# Patient Record
Sex: FEMALE | Race: WHITE | Employment: FULL TIME | URBAN - METROPOLITAN AREA
[De-identification: names, ages, dates, MRNs, and addresses within clinical notes are randomized per-mention and may not be internally consistent; named-entity substitution may affect disease eponyms.]

---

## 2017-07-03 PROBLEM — N93.9 ABNORMAL UTERINE BLEEDING (AUB): Status: ACTIVE | Noted: 2017-07-03

## 2019-08-05 ENCOUNTER — APPOINTMENT (OUTPATIENT)
Dept: GENERAL RADIOLOGY | Age: 54
End: 2019-08-05
Payer: COMMERCIAL

## 2019-08-05 ENCOUNTER — HOSPITAL ENCOUNTER (EMERGENCY)
Age: 54
Discharge: HOME | End: 2019-08-06
Payer: COMMERCIAL

## 2019-08-05 DIAGNOSIS — T07.XXXA MULTIPLE ABRASIONS: ICD-10-CM
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
## 2019-08-05 DIAGNOSIS — S52.601A CLOSED FRACTURE OF DISTAL END OF RIGHT ULNA, UNSPECIFIED FRACTURE MORPHOLOGY, INITIAL ENCOUNTER: ICD-10-CM

## 2019-08-05 PROCEDURE — 96375 TX/PRO/DX INJ NEW DRUG ADDON: CPT

## 2019-08-05 PROCEDURE — 99284 EMERGENCY DEPT VISIT MOD MDM: CPT

## 2019-08-05 PROCEDURE — 96374 THER/PROPH/DIAG INJ IV PUSH: CPT

## 2019-08-05 PROCEDURE — 73502 X-RAY EXAM HIP UNI 2-3 VIEWS: CPT

## 2019-08-05 PROCEDURE — 6360000002 HC RX W HCPCS: Performed by: EMERGENCY MEDICINE

## 2019-08-05 PROCEDURE — 6360000002 HC RX W HCPCS: Performed by: NURSE PRACTITIONER

## 2019-08-05 PROCEDURE — 4500000024 HC ED LEVEL 4 PROCEDURE

## 2019-08-05 PROCEDURE — 96376 TX/PRO/DX INJ SAME DRUG ADON: CPT

## 2019-08-05 PROCEDURE — 73560 X-RAY EXAM OF KNEE 1 OR 2: CPT

## 2019-08-05 PROCEDURE — 73590 X-RAY EXAM OF LOWER LEG: CPT

## 2019-08-05 PROCEDURE — 73110 X-RAY EXAM OF WRIST: CPT

## 2019-08-05 PROCEDURE — 6360000002 HC RX W HCPCS

## 2019-08-05 RX ORDER — ONDANSETRON 2 MG/ML
4 INJECTION INTRAMUSCULAR; INTRAVENOUS ONCE
Status: COMPLETED | OUTPATIENT
Start: 2019-08-05 | End: 2019-08-05

## 2019-08-05 RX ORDER — MORPHINE SULFATE 4 MG/ML
4 INJECTION, SOLUTION INTRAMUSCULAR; INTRAVENOUS
Status: DISCONTINUED | OUTPATIENT
Start: 2019-08-05 | End: 2019-08-06 | Stop reason: HOSPADM

## 2019-08-05 RX ORDER — ONDANSETRON 2 MG/ML
4 INJECTION INTRAMUSCULAR; INTRAVENOUS ONCE
Status: COMPLETED | OUTPATIENT
Start: 2019-08-06 | End: 2019-08-05

## 2019-08-05 RX ORDER — ONDANSETRON 2 MG/ML
INJECTION INTRAMUSCULAR; INTRAVENOUS
Status: COMPLETED
Start: 2019-08-05 | End: 2019-08-05

## 2019-08-05 RX ADMIN — ONDANSETRON 4 MG: 2 INJECTION INTRAMUSCULAR; INTRAVENOUS at 23:22

## 2019-08-05 RX ADMIN — HYDROMORPHONE HYDROCHLORIDE 0.5 MG: 1 INJECTION, SOLUTION INTRAMUSCULAR; INTRAVENOUS; SUBCUTANEOUS at 23:56

## 2019-08-05 RX ADMIN — ONDANSETRON 4 MG: 2 INJECTION INTRAMUSCULAR; INTRAVENOUS at 23:56

## 2019-08-05 RX ADMIN — MORPHINE SULFATE 4 MG: 4 INJECTION, SOLUTION INTRAMUSCULAR; INTRAVENOUS at 23:22

## 2019-08-05 ASSESSMENT — PAIN DESCRIPTION - DESCRIPTORS: DESCRIPTORS: ACHING;THROBBING

## 2019-08-05 ASSESSMENT — PAIN SCALES - GENERAL
PAINLEVEL_OUTOF10: 7
PAINLEVEL_OUTOF10: 9
PAINLEVEL_OUTOF10: 9

## 2019-08-05 ASSESSMENT — PAIN DESCRIPTION - ORIENTATION: ORIENTATION: RIGHT

## 2019-08-05 ASSESSMENT — PAIN DESCRIPTION - FREQUENCY: FREQUENCY: CONTINUOUS

## 2019-08-05 ASSESSMENT — PAIN DESCRIPTION - PAIN TYPE: TYPE: ACUTE PAIN

## 2019-08-05 ASSESSMENT — PAIN DESCRIPTION - ONSET: ONSET: SUDDEN

## 2019-08-05 ASSESSMENT — PAIN DESCRIPTION - PROGRESSION: CLINICAL_PROGRESSION: GRADUALLY WORSENING

## 2019-08-05 ASSESSMENT — PAIN DESCRIPTION - LOCATION: LOCATION: KNEE;WRIST

## 2019-08-06 VITALS
OXYGEN SATURATION: 99 % | HEART RATE: 89 BPM | DIASTOLIC BLOOD PRESSURE: 67 MMHG | TEMPERATURE: 97.9 F | SYSTOLIC BLOOD PRESSURE: 115 MMHG | RESPIRATION RATE: 16 BRPM | HEIGHT: 68 IN | WEIGHT: 157 LBS | BODY MASS INDEX: 23.79 KG/M2

## 2019-08-06 PROCEDURE — 90471 IMMUNIZATION ADMIN: CPT | Performed by: NURSE PRACTITIONER

## 2019-08-06 PROCEDURE — 6370000000 HC RX 637 (ALT 250 FOR IP): Performed by: NURSE PRACTITIONER

## 2019-08-06 PROCEDURE — 6360000002 HC RX W HCPCS: Performed by: NURSE PRACTITIONER

## 2019-08-06 PROCEDURE — 90715 TDAP VACCINE 7 YRS/> IM: CPT | Performed by: NURSE PRACTITIONER

## 2019-08-06 RX ORDER — HYDROCODONE BITARTRATE AND ACETAMINOPHEN 5; 325 MG/1; MG/1
1 TABLET ORAL EVERY 6 HOURS PRN
Qty: 12 TABLET | Refills: 0 | Status: SHIPPED | OUTPATIENT
Start: 2019-08-06 | End: 2019-08-09

## 2019-08-06 RX ORDER — PENICILLIN V POTASSIUM 500 MG/1
500 TABLET ORAL 3 TIMES DAILY
Qty: 21 TABLET | Refills: 0 | Status: SHIPPED | OUTPATIENT
Start: 2019-08-06 | End: 2019-08-13

## 2019-08-06 RX ORDER — HYDROCODONE BITARTRATE AND ACETAMINOPHEN 5; 325 MG/1; MG/1
1 TABLET ORAL ONCE
Status: COMPLETED | OUTPATIENT
Start: 2019-08-06 | End: 2019-08-06

## 2019-08-06 RX ADMIN — HYDROCODONE BITARTRATE AND ACETAMINOPHEN 1 TABLET: 5; 325 TABLET ORAL at 01:19

## 2019-08-06 RX ADMIN — TETANUS TOXOID, REDUCED DIPHTHERIA TOXOID AND ACELLULAR PERTUSSIS VACCINE, ADSORBED 0.5 ML: 5; 2.5; 8; 8; 2.5 SUSPENSION INTRAMUSCULAR at 01:20

## 2019-08-06 ASSESSMENT — PAIN SCALES - GENERAL: PAINLEVEL_OUTOF10: 7

## 2019-08-06 NOTE — PROGRESS NOTES
The wound is cleansed, and debrided of foreign material as much as possible. Wounds were cleaned with dynahex and normal saline. The wound on PT right knee was cleaned with normal saline and dynahex. Irrigated with 100 ml of normal saline. PT verbalized comfort (as much as possible). ED RN aware of wounds being cleaned.
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
## 2020-02-20 ENCOUNTER — HOSPITAL ENCOUNTER (OUTPATIENT)
Dept: WOMENS IMAGING | Age: 55
Discharge: HOME | End: 2020-02-20
Payer: COMMERCIAL

## 2020-02-20 PROCEDURE — 77063 BREAST TOMOSYNTHESIS BI: CPT

## 2020-02-28 ENCOUNTER — HOSPITAL ENCOUNTER (OUTPATIENT)
Dept: WOMENS IMAGING | Age: 55
Discharge: HOME | End: 2020-02-28
Payer: COMMERCIAL

## 2020-02-28 PROCEDURE — G0279 TOMOSYNTHESIS, MAMMO: HCPCS

## 2020-02-28 PROCEDURE — 76642 ULTRASOUND BREAST LIMITED: CPT

## 2020-02-28 NOTE — PROGRESS NOTES
Patient seen for imaging at [facility]. Radiologist recommends breast biopsy. Education regarding biopsy procedure provided. Patient made aware that as a [facility] patient, she will receive a call from their office regarding referral for biopsy. Navigator contact information provided, if any further needs or questions. Reports faxed to [facility].     FRANCIS Robertson, SHARI  Patient [address] [facility]  [phone]

## 2020-03-17 ENCOUNTER — HOSPITAL ENCOUNTER (OUTPATIENT)
Dept: MRI IMAGING | Age: 55
Discharge: HOME | End: 2020-03-17
Payer: COMMERCIAL

## 2020-03-17 PROCEDURE — A9579 GAD-BASE MR CONTRAST NOS,1ML: HCPCS | Performed by: SURGERY

## 2020-03-17 PROCEDURE — 6360000004 HC RX CONTRAST MEDICATION: Performed by: SURGERY

## 2020-03-17 PROCEDURE — 77049 MRI BREAST C-+ W/CAD BI: CPT

## 2020-03-17 RX ADMIN — GADOTERIDOL 14 ML: 279.3 INJECTION, SOLUTION INTRAVENOUS at 11:27

## 2020-03-20 ENCOUNTER — TELEPHONE (OUTPATIENT)
Dept: WOMENS IMAGING | Age: 55
End: 2020-03-20

## 2020-03-20 ENCOUNTER — OFFICE VISIT (OUTPATIENT)
Dept: SURGERY | Age: 55
End: 2020-03-20
Payer: COMMERCIAL

## 2020-03-20 VITALS
HEART RATE: 69 BPM | OXYGEN SATURATION: 98 % | BODY MASS INDEX: 27.43 KG/M2 | TEMPERATURE: 97.2 F | WEIGHT: 181 LBS | DIASTOLIC BLOOD PRESSURE: 68 MMHG | HEIGHT: 68 IN | SYSTOLIC BLOOD PRESSURE: 115 MMHG

## 2020-03-20 PROCEDURE — 99205 OFFICE O/P NEW HI 60 MIN: CPT | Performed by: SURGERY

## 2020-03-20 RX ORDER — TRAZODONE HYDROCHLORIDE 50 MG/1
50 TABLET ORAL NIGHTLY
COMMUNITY

## 2020-03-20 NOTE — PROGRESS NOTES
Negative for new environmental or food allergies. Neurological: Negative for dizziness, seizures, speech difficulty, numbness. Hematological: Negative for adenopathy. Psychiatric/Behavioral: Negative for agitation and confusion. EXAM  /68 (Site: Left Upper Arm, Position: Sitting, Cuff Size: Small Adult)   Pulse 69   Temp 97.2 °F (36.2 °C) (Oral)   Ht 5' 8\" (1.727 m)   Wt 181 lb (82.1 kg)   SpO2 98%   BMI 27.52 kg/m²     GEN: NAD, pleasant, appears stated age  CVS: RRR  PULM: No respiratory distress  HEENT: PERRLA/EOMI; dentition intact, hearing appears within normal limits  NECK: Supple with trachea in midline, no masses  EXT: No lymphedema noted  ABD: soft/NT/ND  No palpable hernia  Excess skin noted  NEURO: No focal deficits, no obvious CN deficits  BACK: Bilateral latiss muscle intact  BREAST:   Physical Exam    Bra size: 36C  Desired bra size: Same size  Ptosis grade:   R: 3   L: 3  The left breast size is larger than the right breast.  There was one palpable mass (hematoma) with no palpable lymphadenopathy in the ipsilateral left axilla. Nipple retraction: No    Left breast sternal notch to nipple: 26 cm  Right breast sternal notch to nipple: 27 cm    Left breast nipple to inframammary fold: 6.2 cm  Right breast nipple to inframammary fold: 6.3 cm    Left breast width 13.2 cm  Right breast width 13.4 cm    IMAGING: Reviewed    IMP: 48 y.o. female with breast cancer who presents for discussion regarding reconstruction options  PLAN: Would benefit from immediate TE reconstruction with Wise pattern Autoderm technique for improved shape/contour. Will await genetic testing to determine if she would like bilateral. May benefit from delayed immediate reconstruction with ISREAL - unsure at this time.     A discussion regarding surgical options including immediate vs delayed approaches, implant based vs autologous, as well as additional planned revisional surgeries was performed with the patient

## 2020-03-20 NOTE — TELEPHONE ENCOUNTER
How long does the test take? About 60 minutes. Most of the time is spent preparing for the images and finding the area for the biopsy. 1.  What are the risks?  Bleeding: You may have some bleeding which can cause bruising, swelling, or hematoma.  Infection: Signs of infection are redness, swelling, heat, or increasing pain at site.  Sample is not adequate: This would require repeating the biopsy. What happens after the test?  The nurse will review your post biopsy instructions which include:        Placing an ice pack in your bra.   Wearing a firm fitting bra.   You may use Tylenol (Acetaminiphen) for discomfort. Lab results take about 2-3 business days. The Nurse Navigator or your doctor will call you with the results. Pre Stereotactic Breast Biopsy:     (Please arrive 15 minutes early)                                                 [x] Blood thinner history reviewed with patient    [x] Take all your other medications on your normal routine schedule. [x] You may eat and drink as normal before your biopsy. [x] You may drive yourself. [x] Take a shower the night before or the morning of the biopsy. [x] No heavy lifting or exercise for 48 hours after the biopsy. [x] Printed Pre Stereotactic Biopsy Instructions were provided, reviewed with the patient and all questions were answered. Duglas Coelho RN  3/20/2020  3:17 PM                [facility] Information: Augustina Canon - Friday. Should you experience any significant changes in your health or have questions about your care, please contact:  Duglas Coelho Nurse Navigator with [facility], [address] [facility]  Monday-Friday. If you need help with your care outside these hours and cannot wait until we are again available, contact your Physician or go to the hospital emergency.

## 2020-03-26 ENCOUNTER — TELEPHONE (OUTPATIENT)
Dept: SURGERY | Age: 55
End: 2020-03-26

## 2020-03-26 ENCOUNTER — HOSPITAL ENCOUNTER (OUTPATIENT)
Age: 55
Setting detail: SPECIMEN
Discharge: HOME | End: 2020-03-26
Payer: COMMERCIAL

## 2020-03-27 ENCOUNTER — HOSPITAL ENCOUNTER (OUTPATIENT)
Dept: GENERAL RADIOLOGY | Age: 55
Discharge: HOME | End: 2020-03-27
Payer: COMMERCIAL

## 2020-03-27 ENCOUNTER — TELEPHONE (OUTPATIENT)
Dept: SURGERY | Age: 55
End: 2020-03-27

## 2020-03-27 ENCOUNTER — HOSPITAL ENCOUNTER (OUTPATIENT)
Age: 55
Discharge: HOME | End: 2020-03-27
Payer: COMMERCIAL

## 2020-03-27 PROCEDURE — 71046 X-RAY EXAM CHEST 2 VIEWS: CPT

## 2020-03-27 NOTE — TELEPHONE ENCOUNTER
Tiff we need to do the insurance approval and let Stephanie Hughes know when this is done [phone]

## 2020-03-30 ENCOUNTER — ANESTHESIA EVENT (OUTPATIENT)
Dept: OPERATING ROOM | Age: 55
End: 2020-03-30
Payer: COMMERCIAL

## 2020-03-30 NOTE — PROGRESS NOTES
[facility] / [facility] ([facility]) [address] [facility], [address]    Acknowledgment of Informed Consent for Surgical or Medical Procedure and Sedation  I agree to allow doctor(s)  Josh Argueta AND Ann Mcbride and his/her associates or assistants, including residents and/or other qualified medical practitioner to perform the following medical treatment or procedure and to administer or direct the administration of sedation as necessary:  Procedure(s): LEFT NIPPLE SPARING MASTECTOMY, SENTINEL NODE BIOPSY/ LEFT BREAST RECONSTRUCTION WITH STAGE 1 TISSUE EXPANDER PLACEMENT WITH AUTODERM, POSSIBLE ALLODERM  My doctor has explained the following regarding the proposed procedure:   the explanation of the procedure   the benefits of the procedure   the potential problems that might occur during recuperation   the risks and side effects of the procedure which could include but are not limited to severe blood loss, infection, stroke or death   the benefits, risks and side effect of alternative procedures including the consequences of declining this procedure or any alternative procedures   the likelihood of achieving satisfactory results. I acknowledge no guarantee or assurance has been made to me regarding the results. I understand that during the course of this treatment/procedure, unforeseen conditions can occur which require an additional or different procedure. I agree to allow my physician or assistants to perform such extension of the original procedure as they may find necessary. I understand that sedation will often result in temporary impairment of memory and fine motor skills and that sedation can occasionally progress to a state of deep sedation or general anesthesia.     I understand the risks of anesthesia for surgery include, but are not limited to, sore throat, hoarseness, injury to face, mouth, or teeth; nausea; headache; injury to blood vessels or nerves; death, brain damage, or paralysis. I understand that if I have a Limitation of Treatment order in effect during my hospitalization, the order may or may not be in effect during this procedure. I give my doctor permission to give me blood or blood products. I understand that there are risks with receiving blood such as hepatitis, AIDS, fever, or allergic reaction. I acknowledge that the risks, benefits, and alternatives of this treatment have been explained to me and that no express or implied warranty has been given by the hospital, any blood bank, or any person or entity as to the blood or blood components transfused. At the discretion of my doctor, I agree to allow observers, equipment/product representatives and allow photographing, and/or televising of the procedure, provided my name or identity is maintained confidentially. I agree the hospital may dispose of or use for scientific or educational purposes any tissue, fluid, or body parts which may be removed.     ________________________________Date________Time______ am/pm  ([place])  Patient or Signature of Closest Relative or Legal Guardian    ________________________________Date________Time______am/pm      Page 1 of  1  Witness

## 2020-03-30 NOTE — TELEPHONE ENCOUNTER
[facility] [phone] to determine if Precert is required for CPT codes: 05252 and [phone]. For Diagnosis code C50.912. PreCert was required. Requested an Urgent precert. Approval Auth # [id number] 3/31/20-3/30/21. Called Nasreen Dubon at number listed below to make her aware.  DONE

## 2020-03-31 ENCOUNTER — ANESTHESIA (OUTPATIENT)
Dept: OPERATING ROOM | Age: 55
End: 2020-03-31
Payer: COMMERCIAL

## 2020-03-31 ENCOUNTER — HOSPITAL ENCOUNTER (OUTPATIENT)
Age: 55
Setting detail: OUTPATIENT SURGERY
Discharge: HOME | End: 2020-03-31
Attending: SURGERY | Admitting: SURGERY
Payer: COMMERCIAL

## 2020-03-31 ENCOUNTER — HOSPITAL ENCOUNTER (OUTPATIENT)
Dept: NUCLEAR MEDICINE | Age: 55
Discharge: HOME | End: 2020-03-31
Payer: COMMERCIAL

## 2020-03-31 VITALS
WEIGHT: 181 LBS | HEIGHT: 68 IN | RESPIRATION RATE: 13 BRPM | DIASTOLIC BLOOD PRESSURE: 67 MMHG | OXYGEN SATURATION: 100 % | SYSTOLIC BLOOD PRESSURE: 120 MMHG | TEMPERATURE: 97.4 F | HEART RATE: 65 BPM | BODY MASS INDEX: 27.43 KG/M2

## 2020-03-31 VITALS
OXYGEN SATURATION: 99 % | DIASTOLIC BLOOD PRESSURE: 56 MMHG | TEMPERATURE: 98.4 F | SYSTOLIC BLOOD PRESSURE: 99 MMHG | RESPIRATION RATE: 10 BRPM

## 2020-03-31 LAB
APTT: 29.7 SEC (ref 24.2–36.2)
INR BLD: 0.96 (ref 0.86–1.14)
PROTHROMBIN TIME: 11.1 SEC (ref 10–13.2)

## 2020-03-31 PROCEDURE — 2720000010 HC SURG SUPPLY STERILE: Performed by: SURGERY

## 2020-03-31 PROCEDURE — 2580000003 HC RX 258: Performed by: ANESTHESIOLOGY

## 2020-03-31 PROCEDURE — 38792 RA TRACER ID OF SENTINL NODE: CPT

## 2020-03-31 PROCEDURE — 6360000002 HC RX W HCPCS: Performed by: ANESTHESIOLOGY

## 2020-03-31 PROCEDURE — 85730 THROMBOPLASTIN TIME PARTIAL: CPT

## 2020-03-31 PROCEDURE — 3700000001 HC ADD 15 MINUTES (ANESTHESIA): Performed by: SURGERY

## 2020-03-31 PROCEDURE — 88307 TISSUE EXAM BY PATHOLOGIST: CPT

## 2020-03-31 PROCEDURE — 88342 IMHCHEM/IMCYTCHM 1ST ANTB: CPT

## 2020-03-31 PROCEDURE — 19357 TISS XPNDR PLMT BRST RCNSTJ: CPT | Performed by: SURGERY

## 2020-03-31 PROCEDURE — C1789 PROSTHESIS, BREAST, IMP: HCPCS | Performed by: SURGERY

## 2020-03-31 PROCEDURE — 85610 PROTHROMBIN TIME: CPT

## 2020-03-31 PROCEDURE — 6370000000 HC RX 637 (ALT 250 FOR IP): Performed by: SURGERY

## 2020-03-31 PROCEDURE — 2709999900 HC NON-CHARGEABLE SUPPLY: Performed by: SURGERY

## 2020-03-31 PROCEDURE — 2580000003 HC RX 258: Performed by: SURGERY

## 2020-03-31 PROCEDURE — 2500000003 HC RX 250 WO HCPCS: Performed by: SURGERY

## 2020-03-31 PROCEDURE — 7100000001 HC PACU RECOVERY - ADDTL 15 MIN: Performed by: SURGERY

## 2020-03-31 PROCEDURE — 3600000014 HC SURGERY LEVEL 4 ADDTL 15MIN: Performed by: SURGERY

## 2020-03-31 PROCEDURE — 6360000002 HC RX W HCPCS: Performed by: NURSE ANESTHETIST, CERTIFIED REGISTERED

## 2020-03-31 PROCEDURE — A9541 TC99M SULFUR COLLOID: HCPCS | Performed by: SURGERY

## 2020-03-31 PROCEDURE — 6360000002 HC RX W HCPCS: Performed by: SURGERY

## 2020-03-31 PROCEDURE — 2500000003 HC RX 250 WO HCPCS: Performed by: NURSE ANESTHETIST, CERTIFIED REGISTERED

## 2020-03-31 PROCEDURE — 88331 PATH CONSLTJ SURG 1 BLK 1SPC: CPT

## 2020-03-31 PROCEDURE — 3430000000 HC RX DIAGNOSTIC RADIOPHARMACEUTICAL: Performed by: SURGERY

## 2020-03-31 PROCEDURE — 3700000000 HC ANESTHESIA ATTENDED CARE: Performed by: SURGERY

## 2020-03-31 PROCEDURE — 7100000000 HC PACU RECOVERY - FIRST 15 MIN: Performed by: SURGERY

## 2020-03-31 PROCEDURE — C9290 INJ, BUPIVACAINE LIPOSOME: HCPCS | Performed by: SURGERY

## 2020-03-31 PROCEDURE — C1729 CATH, DRAINAGE: HCPCS | Performed by: SURGERY

## 2020-03-31 PROCEDURE — 3600000004 HC SURGERY LEVEL 4 BASE: Performed by: SURGERY

## 2020-03-31 DEVICE — TEXTURED, HIGH PROFILE, SUTURE TABS, INTEGRAL INJECTION DOME, 475CC
Type: IMPLANTABLE DEVICE | Site: NIPPLE | Status: FUNCTIONAL
Brand: ARTOURA BREAST TISSUE EXPANDER

## 2020-03-31 RX ORDER — CLINDAMYCIN HYDROCHLORIDE 300 MG/1
300 CAPSULE ORAL 3 TIMES DAILY
Qty: 30 CAPSULE | Refills: 0 | Status: SHIPPED | OUTPATIENT
Start: 2020-03-31 | End: 2020-04-10

## 2020-03-31 RX ORDER — FENTANYL CITRATE 50 UG/ML
25 INJECTION, SOLUTION INTRAMUSCULAR; INTRAVENOUS EVERY 5 MIN PRN
Status: DISCONTINUED | OUTPATIENT
Start: 2020-03-31 | End: 2020-03-31 | Stop reason: HOSPADM

## 2020-03-31 RX ORDER — ROCURONIUM BROMIDE 10 MG/ML
INJECTION, SOLUTION INTRAVENOUS PRN
Status: DISCONTINUED | OUTPATIENT
Start: 2020-03-31 | End: 2020-03-31 | Stop reason: SDUPTHER

## 2020-03-31 RX ORDER — 0.9 % SODIUM CHLORIDE 0.9 %
500 INTRAVENOUS SOLUTION INTRAVENOUS
Status: DISCONTINUED | OUTPATIENT
Start: 2020-03-31 | End: 2020-03-31 | Stop reason: HOSPADM

## 2020-03-31 RX ORDER — LIDOCAINE HYDROCHLORIDE 20 MG/ML
INJECTION, SOLUTION INFILTRATION; PERINEURAL PRN
Status: DISCONTINUED | OUTPATIENT
Start: 2020-03-31 | End: 2020-03-31 | Stop reason: SDUPTHER

## 2020-03-31 RX ORDER — GLYCOPYRROLATE 0.2 MG/ML
INJECTION INTRAMUSCULAR; INTRAVENOUS PRN
Status: DISCONTINUED | OUTPATIENT
Start: 2020-03-31 | End: 2020-03-31 | Stop reason: SDUPTHER

## 2020-03-31 RX ORDER — FENTANYL CITRATE 50 UG/ML
INJECTION, SOLUTION INTRAMUSCULAR; INTRAVENOUS PRN
Status: DISCONTINUED | OUTPATIENT
Start: 2020-03-31 | End: 2020-03-31 | Stop reason: SDUPTHER

## 2020-03-31 RX ORDER — HYDROMORPHONE HCL 110MG/55ML
PATIENT CONTROLLED ANALGESIA SYRINGE INTRAVENOUS PRN
Status: DISCONTINUED | OUTPATIENT
Start: 2020-03-31 | End: 2020-03-31 | Stop reason: SDUPTHER

## 2020-03-31 RX ORDER — DIAZEPAM 5 MG/1
5 TABLET ORAL EVERY 6 HOURS PRN
Status: COMPLETED | OUTPATIENT
Start: 2020-03-31 | End: 2020-03-31

## 2020-03-31 RX ORDER — DIAZEPAM 5 MG/1
5 TABLET ORAL EVERY 6 HOURS PRN
Qty: 30 TABLET | Refills: 0 | Status: SHIPPED | OUTPATIENT
Start: 2020-03-31 | End: 2020-04-10

## 2020-03-31 RX ORDER — ONDANSETRON 2 MG/ML
4 INJECTION INTRAMUSCULAR; INTRAVENOUS
Status: COMPLETED | OUTPATIENT
Start: 2020-03-31 | End: 2020-03-31

## 2020-03-31 RX ORDER — ONDANSETRON 2 MG/ML
INJECTION INTRAMUSCULAR; INTRAVENOUS PRN
Status: DISCONTINUED | OUTPATIENT
Start: 2020-03-31 | End: 2020-03-31 | Stop reason: SDUPTHER

## 2020-03-31 RX ORDER — PROPOFOL 10 MG/ML
INJECTION, EMULSION INTRAVENOUS PRN
Status: DISCONTINUED | OUTPATIENT
Start: 2020-03-31 | End: 2020-03-31 | Stop reason: SDUPTHER

## 2020-03-31 RX ORDER — SODIUM CHLORIDE, SODIUM LACTATE, POTASSIUM CHLORIDE, CALCIUM CHLORIDE 600; 310; 30; 20 MG/100ML; MG/100ML; MG/100ML; MG/100ML
INJECTION, SOLUTION INTRAVENOUS CONTINUOUS
Status: DISCONTINUED | OUTPATIENT
Start: 2020-03-31 | End: 2020-03-31 | Stop reason: HOSPADM

## 2020-03-31 RX ORDER — OXYCODONE HYDROCHLORIDE AND ACETAMINOPHEN 5; 325 MG/1; MG/1
1 TABLET ORAL EVERY 6 HOURS PRN
Qty: 28 TABLET | Refills: 0 | Status: SHIPPED | OUTPATIENT
Start: 2020-03-31 | End: 2020-04-07

## 2020-03-31 RX ORDER — CLINDAMYCIN PHOSPHATE 900 MG/50ML
900 INJECTION INTRAVENOUS
Status: COMPLETED | OUTPATIENT
Start: 2020-03-31 | End: 2020-03-31

## 2020-03-31 RX ORDER — ISOSULFAN BLUE 50 MG/5ML
INJECTION, SOLUTION SUBCUTANEOUS PRN
Status: DISCONTINUED | OUTPATIENT
Start: 2020-03-31 | End: 2020-03-31 | Stop reason: ALTCHOICE

## 2020-03-31 RX ORDER — OXYCODONE HYDROCHLORIDE AND ACETAMINOPHEN 5; 325 MG/1; MG/1
1 TABLET ORAL EVERY 6 HOURS PRN
Status: COMPLETED | OUTPATIENT
Start: 2020-03-31 | End: 2020-03-31

## 2020-03-31 RX ORDER — DEXAMETHASONE SODIUM PHOSPHATE 4 MG/ML
INJECTION, SOLUTION INTRA-ARTICULAR; INTRALESIONAL; INTRAMUSCULAR; INTRAVENOUS; SOFT TISSUE PRN
Status: DISCONTINUED | OUTPATIENT
Start: 2020-03-31 | End: 2020-03-31

## 2020-03-31 RX ORDER — DEXAMETHASONE SODIUM PHOSPHATE 4 MG/ML
4 INJECTION, SOLUTION INTRA-ARTICULAR; INTRALESIONAL; INTRAMUSCULAR; INTRAVENOUS; SOFT TISSUE
Status: DISCONTINUED | OUTPATIENT
Start: 2020-03-31 | End: 2020-03-31 | Stop reason: HOSPADM

## 2020-03-31 RX ADMIN — SODIUM CHLORIDE, SODIUM LACTATE, POTASSIUM CHLORIDE, AND CALCIUM CHLORIDE: 600; 310; 30; 20 INJECTION, SOLUTION INTRAVENOUS at 09:57

## 2020-03-31 RX ADMIN — GLYCOPYRROLATE 0.7 MG: 0.2 INJECTION INTRAMUSCULAR; INTRAVENOUS at 16:27

## 2020-03-31 RX ADMIN — ROCURONIUM BROMIDE 80 MG: 10 INJECTION, SOLUTION INTRAVENOUS at 13:24

## 2020-03-31 RX ADMIN — ONDANSETRON 4 MG: 2 INJECTION INTRAMUSCULAR; INTRAVENOUS at 13:20

## 2020-03-31 RX ADMIN — ROCURONIUM BROMIDE 30 MG: 10 INJECTION, SOLUTION INTRAVENOUS at 15:26

## 2020-03-31 RX ADMIN — PROPOFOL 100 MG: 10 INJECTION, EMULSION INTRAVENOUS at 16:32

## 2020-03-31 RX ADMIN — SODIUM CHLORIDE, SODIUM LACTATE, POTASSIUM CHLORIDE, AND CALCIUM CHLORIDE: 600; 310; 30; 20 INJECTION, SOLUTION INTRAVENOUS at 14:40

## 2020-03-31 RX ADMIN — PHENYLEPHRINE HYDROCHLORIDE 80 MCG: 10 INJECTION, SOLUTION INTRAMUSCULAR; INTRAVENOUS; SUBCUTANEOUS at 13:33

## 2020-03-31 RX ADMIN — HYDROMORPHONE HYDROCHLORIDE 2 MG: 2 INJECTION, SOLUTION INTRAMUSCULAR; INTRAVENOUS; SUBCUTANEOUS at 13:24

## 2020-03-31 RX ADMIN — PROPOFOL 200 MG: 10 INJECTION, EMULSION INTRAVENOUS at 13:24

## 2020-03-31 RX ADMIN — GLYCOPYRROLATE 0.3 MG: 0.2 INJECTION INTRAMUSCULAR; INTRAVENOUS at 15:23

## 2020-03-31 RX ADMIN — DIAZEPAM 5 MG: 5 TABLET ORAL at 18:38

## 2020-03-31 RX ADMIN — LIDOCAINE HYDROCHLORIDE 100 MG: 20 INJECTION, SOLUTION INFILTRATION; PERINEURAL at 13:24

## 2020-03-31 RX ADMIN — ONDANSETRON 4 MG: 2 INJECTION INTRAMUSCULAR; INTRAVENOUS at 18:02

## 2020-03-31 RX ADMIN — GLYCOPYRROLATE 0.3 MG: 0.2 INJECTION INTRAMUSCULAR; INTRAVENOUS at 13:33

## 2020-03-31 RX ADMIN — OXYCODONE HYDROCHLORIDE AND ACETAMINOPHEN 1 TABLET: 5; 325 TABLET ORAL at 18:38

## 2020-03-31 RX ADMIN — Medication 0.8 MILLICURIE: at 13:29

## 2020-03-31 RX ADMIN — NEOSTIGMINE METHYLSULFATE 4 MG: 1 INJECTION INTRAMUSCULAR; INTRAVENOUS; SUBCUTANEOUS at 16:27

## 2020-03-31 RX ADMIN — CLINDAMYCIN PHOSPHATE 900 MG: 18 INJECTION, SOLUTION INTRAVENOUS at 13:36

## 2020-03-31 RX ADMIN — TRANEXAMIC ACID 1000 MG: 1 INJECTION, SOLUTION INTRAVENOUS at 14:14

## 2020-03-31 RX ADMIN — FENTANYL CITRATE 100 MCG: 50 INJECTION INTRAMUSCULAR; INTRAVENOUS at 16:32

## 2020-03-31 RX ADMIN — FAMOTIDINE 20 MG: 10 INJECTION, SOLUTION INTRAVENOUS at 13:19

## 2020-03-31 ASSESSMENT — PULMONARY FUNCTION TESTS
PIF_VALUE: 18
PIF_VALUE: 14
PIF_VALUE: 14
PIF_VALUE: 17
PIF_VALUE: 16
PIF_VALUE: 16
PIF_VALUE: 18
PIF_VALUE: 17
PIF_VALUE: 16
PIF_VALUE: 17
PIF_VALUE: 16
PIF_VALUE: 17
PIF_VALUE: 14
PIF_VALUE: 18
PIF_VALUE: 17
PIF_VALUE: 16
PIF_VALUE: 17
PIF_VALUE: 17
PIF_VALUE: 16
PIF_VALUE: 14
PIF_VALUE: 18
PIF_VALUE: 1
PIF_VALUE: 17
PIF_VALUE: 1
PIF_VALUE: 17
PIF_VALUE: 16
PIF_VALUE: 17
PIF_VALUE: 16
PIF_VALUE: 17
PIF_VALUE: 17
PIF_VALUE: 16
PIF_VALUE: 18
PIF_VALUE: 14
PIF_VALUE: 17
PIF_VALUE: 18
PIF_VALUE: 17
PIF_VALUE: 18
PIF_VALUE: 16
PIF_VALUE: 17
PIF_VALUE: 16
PIF_VALUE: 17
PIF_VALUE: 16
PIF_VALUE: 14
PIF_VALUE: 14
PIF_VALUE: 16
PIF_VALUE: 17
PIF_VALUE: 16
PIF_VALUE: 17
PIF_VALUE: 1
PIF_VALUE: 1
PIF_VALUE: 17
PIF_VALUE: 17
PIF_VALUE: 18
PIF_VALUE: 17
PIF_VALUE: 8
PIF_VALUE: 17
PIF_VALUE: 16
PIF_VALUE: 16
PIF_VALUE: 17
PIF_VALUE: 4
PIF_VALUE: 17
PIF_VALUE: 16
PIF_VALUE: 17
PIF_VALUE: 18
PIF_VALUE: 18
PIF_VALUE: 1
PIF_VALUE: 17
PIF_VALUE: 16
PIF_VALUE: 17
PIF_VALUE: 16
PIF_VALUE: 17
PIF_VALUE: 16
PIF_VALUE: 17
PIF_VALUE: 18
PIF_VALUE: 17
PIF_VALUE: 16
PIF_VALUE: 17
PIF_VALUE: 17
PIF_VALUE: 16
PIF_VALUE: 18
PIF_VALUE: 20
PIF_VALUE: 16
PIF_VALUE: 18
PIF_VALUE: 16
PIF_VALUE: 17
PIF_VALUE: 18
PIF_VALUE: 14
PIF_VALUE: 17
PIF_VALUE: 19
PIF_VALUE: 17
PIF_VALUE: 1
PIF_VALUE: 17
PIF_VALUE: 2
PIF_VALUE: 16
PIF_VALUE: 16
PIF_VALUE: 19
PIF_VALUE: 17
PIF_VALUE: 15
PIF_VALUE: 18
PIF_VALUE: 16
PIF_VALUE: 16
PIF_VALUE: 17
PIF_VALUE: 16
PIF_VALUE: 26
PIF_VALUE: 17
PIF_VALUE: 18
PIF_VALUE: 17
PIF_VALUE: 18
PIF_VALUE: 16
PIF_VALUE: 17
PIF_VALUE: 16
PIF_VALUE: 17
PIF_VALUE: 14
PIF_VALUE: 18
PIF_VALUE: 17
PIF_VALUE: 17
PIF_VALUE: 18
PIF_VALUE: 17
PIF_VALUE: 17
PIF_VALUE: 15
PIF_VALUE: 17
PIF_VALUE: 17
PIF_VALUE: 16
PIF_VALUE: 18
PIF_VALUE: 17
PIF_VALUE: 16
PIF_VALUE: 18
PIF_VALUE: 16
PIF_VALUE: 17
PIF_VALUE: 8
PIF_VALUE: 17
PIF_VALUE: 17
PIF_VALUE: 16
PIF_VALUE: 13
PIF_VALUE: 18
PIF_VALUE: 16
PIF_VALUE: 8
PIF_VALUE: 17
PIF_VALUE: 18
PIF_VALUE: 17
PIF_VALUE: 17
PIF_VALUE: 8
PIF_VALUE: 14
PIF_VALUE: 16
PIF_VALUE: 17
PIF_VALUE: 17
PIF_VALUE: 16
PIF_VALUE: 16
PIF_VALUE: 19
PIF_VALUE: 16
PIF_VALUE: 18
PIF_VALUE: 17
PIF_VALUE: 16
PIF_VALUE: 17
PIF_VALUE: 17
PIF_VALUE: 14
PIF_VALUE: 18
PIF_VALUE: 17
PIF_VALUE: 26
PIF_VALUE: 17
PIF_VALUE: 17
PIF_VALUE: 0
PIF_VALUE: 17
PIF_VALUE: 19
PIF_VALUE: 18
PIF_VALUE: 17
PIF_VALUE: 14
PIF_VALUE: 16
PIF_VALUE: 16
PIF_VALUE: 17
PIF_VALUE: 17
PIF_VALUE: 18
PIF_VALUE: 0
PIF_VALUE: 16
PIF_VALUE: 18
PIF_VALUE: 17

## 2020-03-31 ASSESSMENT — PAIN SCALES - GENERAL
PAINLEVEL_OUTOF10: 0
PAINLEVEL_OUTOF10: 4

## 2020-03-31 ASSESSMENT — PAIN DESCRIPTION - LOCATION
LOCATION: BREAST
LOCATION: BREAST

## 2020-03-31 ASSESSMENT — PAIN - FUNCTIONAL ASSESSMENT: PAIN_FUNCTIONAL_ASSESSMENT: 0-10

## 2020-03-31 ASSESSMENT — PAIN DESCRIPTION - ORIENTATION
ORIENTATION: LEFT
ORIENTATION: LEFT

## 2020-03-31 ASSESSMENT — PAIN DESCRIPTION - PAIN TYPE
TYPE: SURGICAL PAIN
TYPE: SURGICAL PAIN

## 2020-03-31 NOTE — ANESTHESIA POSTPROCEDURE EVALUATION
Department of Anesthesiology  Postprocedure Note    Patient: Symone Sharif  MRN: [id number]  YOB: 1965  Date of evaluation: 3/31/2020  Time:  6:07 PM     Procedure Summary     Date:  03/31/20 Room / Location:  [facility]    Anesthesia Start:  2665 Anesthesia Stop:  3887    Procedures:       LEFT NIPPLE SPARING MASTECTOMY, SENTINEL NODE BIOPSY/ (Left Breast)      LEFT BREAST RECONSTRUCTION WITH STAGE 1 TISSUE EXPANDER PLACEMENT WITH AUTODERM (Left Breast) Diagnosis:       Malignant neoplasm of overlapping sites of left female breast, unspecified estrogen receptor status ([facility] [address]      (Malignant neoplasm of overlapping sites of left female breast, unspecified estrogen receptor status ([facility] [address] Stefania Delgadillo)    Surgeon:  Gary Horne MD; María Elena Mike MD Responsible Provider:  Shaquille Baird MD    Anesthesia Type:  general ASA Status:  2          Anesthesia Type: general    Salma Phase I: Salma Score: 8    Salma Phase II:      Last vitals: Reviewed and per EMR flowsheets.        Anesthesia Post Evaluation    Patient location during evaluation: PACU  Patient participation: complete - patient participated  Level of consciousness: awake  Pain score: 2  Airway patency: patent  Nausea & Vomiting: no nausea and no vomiting  Complications: no  Cardiovascular status: hemodynamically stable  Respiratory status: acceptable  Hydration status: euvolemic

## 2020-03-31 NOTE — H&P
[address] [place]    [phone]    [facility] Update H & P  Department of General Surgery   Surgical Service   Pre-operative History and Physical  Last H & P within the last 30 days. DIAGNOSIS:   Malignant neoplasm of overlapping sites of left female breast, unspecified estrogen receptor status ([facility] [address] [C50.812]    PROCEDURE:  PA MASTECTOMY, SIMPLE, COMPLETE [53918] (LEFT NIPPLE SPARING MASTECTOMY, SENTINEL NODE BIOPSY/)  LEFT BREAST RECONSTRUCTION WITH STAGE 1 TISSUE EXPANDER PLACEMENT WITH AUTODERM, POSSIBLE ALLODERM      HISTORY OF PRESENT ILLNESS:   Patient with screening mammogram detected left breast mass. Biopsy demonstrates invasive ductal carcinoma and the patient presents today for the above procedure.        Past Medical History:        Diagnosis Date    Acid reflux     history of no longer a problem    Anemia     Anemia     Blood transfusion         Menorrhagia     Obesity     Shingles 2016    Sleep apnea     LOST WT NO LONGER AN ISSUE     Past Surgical History:        Procedure Laterality Date     SECTION      CHOLECYSTECTOMY  2016    laparoscopic    COLONOSCOPY      HYSTERECTOMY  2017    Robotic assisted total laparoscopic hysterectomy with bilateral salpingectomy    OTHER SURGICAL HISTORY  2011    DILATATION AND CURETTAGE, HYSTEROSCOPY NOVASURE ENDOMETRIAL    SLEEVE GASTRECTOMY      TUBAL LIGATION       Past Social History:  Social History     Socioeconomic History    Marital status:      Spouse name: Not on file    Number of children: 3    Years of education: Not on file    Highest education level: Not on file   Occupational History    Not on file   Social Needs    Financial resource strain: Not on file    Food insecurity     Worry: Not on file     Inability: Not on file    Transportation needs     Medical: Not on file     Non-medical: Not on file   Tobacco Use    Smoking status: Never Smoker    Smokeless

## 2020-03-31 NOTE — OP NOTE
wall also using electrocautery. Clips were utilized for perforating vessels. An Exparel breast block totaling 30cc was then performed. Hemostasis was then reassessed after the wound was copiously irrigated. An expander was brought to the field, placed in triple antibiotic solution, and evacuated of air after gloves were changed. The expander was then secured in place at the 3 o'clock, 6 o'clock, and 9 o'clock tab positions with 2-0 PDS suture. Complete coverage of the expander was then performed by suturing the cut end of the pectoralis muscle to the Autoderm using 2-0 Vicryl sutures. A 15 drain was then placed and brought out through a separate stab incision and placed around the mastectomy defect as well as within the expander pocket. This was secured in place using a 3-0 Nylon suture. Approximately 100 mL of injectable saline was placed into the expander without evidence of tension on the mastectomy skin flaps. The wound was then closed in layers after hemostasis was again checked using 3-0 Monocryl suture. A dressing was then applied. The patient was then awakened, extubated, and taken to the PACU in stable condition. At the end of the case, all counts were correct and there were no immediate complications. The patient tolerated the procedure well. DRAINS: 1 (15F in the left breast)    SPECIMEN: None from plastics    CONDITION: Stable    MASTECTOMY SPECIMEN WEIGHT: L 470 grams    IMPLANTS:  (Left)   475 mL [place] Flora High Profile, reference# Georgiann Fees, lot# [id number].       Doreen Bunn

## 2020-03-31 NOTE — ANESTHESIA PRE PROCEDURE
Department of Anesthesiology  Preprocedure Note       Name:  Nellie Morejon   Age:  48 y.o.  :  1965                                          MRN:  [id number]         Date:  3/31/2020      Surgeon: Bre Barajas):  MD Amara Pinto MD    Procedure: LEFT NIPPLE SPARING MASTECTOMY, SENTINEL NODE BIOPSY/ (Left )  LEFT BREAST RECONSTRUCTION WITH STAGE 1 TISSUE EXPANDER PLACEMENT WITH AUTODERM, POSSIBLE ALLODERM (Left )    Medications prior to admission:   Prior to Admission medications    Medication Sig Start Date End Date Taking? Authorizing Provider   traZODone (DESYREL) 50 MG tablet Take 50 mg by mouth nightly   Yes Historical Provider, MD   Multiple Vitamins-Minerals (THERAPEUTIC MULTIVITAMIN-MINERALS) tablet Take 1 tablet by mouth daily. Yes Historical Provider, MD   ibuprofen (ADVIL;MOTRIN) 600 MG tablet Take 1 tablet by mouth every 6 hours as needed for Pain 7/3/17   Oj Ahn MD   ondansetron [facility]) 4 MG tablet Take 1 tablet by mouth every 8 hours as needed for Nausea or Vomiting 6/3/16   Wendy Dandy, MD   clonazePAM (KLONOPIN) 1 MG tablet Take 1 mg by mouth nightly as needed for Anxiety    Historical Provider, MD   fexofenadine-pseudoephedrine (ALLEGRA-D 24) 180-240 MG per tablet Take 1 tablet by mouth once    Historical Provider, MD   albuterol sulfate  (90 BASE) MCG/ACT inhaler Inhale 2 puffs into the lungs every 6 hours as needed for Wheezing    Historical Provider, MD       Current medications:    Current Facility-Administered Medications   Medication Dose Route Frequency Provider Last Rate Last Dose    clindamycin (CLEOCIN) 900 mg in dextrose 5 % 50 mL IVPB  900 mg Intravenous On Call to [address] Chu MD        lactated ringers infusion   Intravenous Continuous Vaibhav Cart, DO           Allergies:     Allergies   Allergen Reactions    Cephalexin Rash       Problem List:    Patient Active Problem List   Diagnosis Code  Cholelithiases K80.20    Cholecystitis K81.9    Acute cholecystitis K81.0    S/P laparoscopic cholecystectomy Z90.49    Abnormal uterine bleeding (AUB) N93.9       Past Medical History:        Diagnosis Date    Acid reflux     history of no longer a problem    Anemia     Anemia     Blood transfusion     1985    Menorrhagia     Obesity     Shingles 2016    Sleep apnea     LOST WT NO LONGER AN ISSUE       Past Surgical History:        Procedure Laterality Date     SECTION      CHOLECYSTECTOMY  2016    laparoscopic    COLONOSCOPY      HYSTERECTOMY  2017    Robotic assisted total laparoscopic hysterectomy with bilateral salpingectomy    OTHER SURGICAL HISTORY  2011    DILATATION AND CURETTAGE, HYSTEROSCOPY NOVASURE ENDOMETRIAL    SLEEVE GASTRECTOMY      TUBAL LIGATION         Social History:    Social History     Tobacco Use    Smoking status: Never Smoker    Smokeless tobacco: Never Used   Substance Use Topics    Alcohol use: Yes     Comment: OCCASIONAL                                Counseling given: Not Answered      Vital Signs (Current):   Vitals:    20 0851   BP: 119/80   Pulse: 64   Resp: 18   Temp: 97.7 °F (36.5 °C)   TempSrc: Oral   SpO2: 99%   Weight: 181 lb (82.1 kg)   Height: 5' 8\" (1.727 m)                                              BP Readings from Last 3 Encounters:   20 119/80   20 115/68   19 115/67       NPO Status: Time of last liquid consumption:                         Time of last solid consumption:                         Date of last liquid consumption: 20                        Date of last solid food consumption: 20    BMI:   Wt Readings from Last 3 Encounters:   20 181 lb (82.1 kg)   20 181 lb (82.1 kg)   19 157 lb (71.2 kg)     Body mass index is 27.52 kg/m².     CBC:   Lab Results   Component Value Date    WBC 5.0 2017    RBC 4.71 2017    HGB 11.7 2017 HCT 35.5 07/03/2017    MCV 75.4 07/03/2017    RDW 13.8 07/03/2017     07/03/2017       CMP:   Lab Results   Component Value Date     06/02/2016    K 3.9 06/02/2016     06/02/2016    CO2 26 06/02/2016    BUN 6 06/02/2016    CREATININE <0.5 06/02/2016    GFRAA >60 06/02/2016    GFRAA >60 02/04/2012    AGRATIO 1.4 06/01/2016    LABGLOM >60 06/02/2016    GLUCOSE 85 06/02/2016    PROT 6.5 06/24/2016    PROT 8.0 02/04/2012    CALCIUM 8.0 06/02/2016    BILITOT <0.2 06/24/2016    ALKPHOS 58 06/24/2016    AST 16 06/24/2016    ALT 11 06/24/2016       POC Tests: No results for input(s): POCGLU, POCNA, POCK, POCCL, POCBUN, POCHEMO, POCHCT in the last 72 hours. Coags:   Lab Results   Component Value Date    PROTIME 12.0 06/02/2016    INR 1.05 06/02/2016       HCG (If Applicable):   Lab Results   Component Value Date    PREGTESTUR Negative 07/03/2017        ABGs: No results found for: PHART, PO2ART, NOV8RLN, MDP5MKV, BEART, L2VVJHCH     Type & Screen (If Applicable):  No results found for: [facility]    Anesthesia Evaluation  Patient summary reviewed and Nursing notes reviewed  Airway: Mallampati: II  TM distance: >3 FB   Neck ROM: full  Mouth opening: > = 3 FB Dental: normal exam         Pulmonary:normal exam  breath sounds clear to auscultation  (+) sleep apnea:            Patient did not smoke on day of surgery. Cardiovascular:Negative CV ROS  Exercise tolerance: good (>4 METS),           Rhythm: regular  Rate: normal           Beta Blocker:  Not on Beta Blocker         Neuro/Psych:   Negative Neuro/Psych ROS              GI/Hepatic/Renal:   (+) GERD: well controlled,           Endo/Other: Negative Endo/Other ROS                    Abdominal:       Abdomen: soft. Vascular: negative vascular ROS. Anesthesia Plan      general     ASA 2       Induction: intravenous.     MIPS: Postoperative opioids intended and Prophylactic antiemetics

## 2020-04-03 ENCOUNTER — TELEPHONE (OUTPATIENT)
Dept: SURGERY | Age: 55
End: 2020-04-03

## 2020-04-03 NOTE — TELEPHONE ENCOUNTER
Pt had surgery on 3/31  She would like to know if she can take a shower?   Where the clear tape on her stomach is she is having a lot of itching  Also she will need a post op appt    Please advise

## 2020-04-07 ENCOUNTER — TELEMEDICINE (OUTPATIENT)
Dept: SURGERY | Age: 55
End: 2020-04-07

## 2020-04-07 PROCEDURE — 99024 POSTOP FOLLOW-UP VISIT: CPT | Performed by: SURGERY

## 2020-04-07 NOTE — PROGRESS NOTES
[facility]    Due to COVID-19, a video consultation was performed and consent was obtained at the beginning of the video for the e-visit. PROCEDURE: Unilateral TE reconstruction (475 cc - 100cc placed)  DATE: 3/31/20    Lisseth Lamas has been recovering well since her procedure. Pain has been well controlled without pain medications. No fevers, chills, or coughing. EXAM    GEN: NAD   BREAST: Incision appears to be healing well  No obvious hematoma/seroma  Drain serosang    IMP: 48 y. o.female s/p unilateral TE reconstruction  PLAN: Doing well. Will follow-up in 2 weeks for drain removal as well as initiation of fill.     Danya Banks MD  [address] [place] [address] [address] Reconstructive Surgery  [phone]  04/07/20

## 2020-04-20 ENCOUNTER — OFFICE VISIT (OUTPATIENT)
Dept: SURGERY | Age: 55
End: 2020-04-20

## 2020-04-20 VITALS
HEART RATE: 61 BPM | TEMPERATURE: 97.8 F | WEIGHT: 183 LBS | BODY MASS INDEX: 27.74 KG/M2 | SYSTOLIC BLOOD PRESSURE: 106 MMHG | HEIGHT: 68 IN | OXYGEN SATURATION: 100 % | DIASTOLIC BLOOD PRESSURE: 64 MMHG

## 2020-04-20 PROCEDURE — 99024 POSTOP FOLLOW-UP VISIT: CPT | Performed by: SURGERY

## 2020-05-04 ENCOUNTER — OFFICE VISIT (OUTPATIENT)
Dept: SURGERY | Age: 55
End: 2020-05-04

## 2020-05-04 VITALS
OXYGEN SATURATION: 98 % | SYSTOLIC BLOOD PRESSURE: 111 MMHG | BODY MASS INDEX: 27.71 KG/M2 | TEMPERATURE: 98.2 F | HEIGHT: 68 IN | HEART RATE: 61 BPM | WEIGHT: 182.8 LBS | DIASTOLIC BLOOD PRESSURE: 71 MMHG

## 2020-05-04 PROCEDURE — 99024 POSTOP FOLLOW-UP VISIT: CPT | Performed by: SURGERY

## 2020-05-04 NOTE — PROGRESS NOTES
[facility]     PROCEDURE: Unilateral TE reconstruction (475 cc - 100cc placed)  DATE: 3/31/20     Yunior Romero has been recovering well since her procedure. Pain has been well controlled without pain medications.      EXAM     /71 (Site: Right Upper Arm, Position: Sitting, Cuff Size: Small Adult)   Pulse 61   Temp 98.2 °F (36.8 °C) (Oral)   Ht 5' 8\" (1.727 m)   Wt 182 lb 12.8 oz (82.9 kg)   LMP 06/13/2017   SpO2 98%   BMI 27.79 kg/m²      GEN: NAD   BREAST: Incision healing well                      No hematoma/seroma                             IMP: 48 y. o.female s/p TE reconstruction  PLAN: Doing well. A total of 150cc of saline was filled today bringing the total to 400 cc in the right breast.  (Implant size 475 cc). Will obtain a CTA for potential ISREAL planning.  She is leaning toward autologous reconstruction (given her excess abdominal tissue vs implant based reconstruction).     Joe Crespo MD  [address] [place] [address] [address] Reconstructive Surgery  [phone]  05/04/20

## 2020-05-08 ENCOUNTER — HOSPITAL ENCOUNTER (OUTPATIENT)
Dept: CT IMAGING | Age: 55
Discharge: HOME | End: 2020-05-08
Payer: COMMERCIAL

## 2020-05-08 PROCEDURE — 74174 CTA ABD&PLVS W/CONTRAST: CPT

## 2020-05-08 PROCEDURE — 6360000004 HC RX CONTRAST MEDICATION: Performed by: SURGERY

## 2020-05-08 RX ADMIN — IOPAMIDOL 75 ML: 755 INJECTION, SOLUTION INTRAVENOUS at 08:23

## 2020-05-11 ENCOUNTER — TELEPHONE (OUTPATIENT)
Dept: SURGERY | Age: 55
End: 2020-05-11

## 2020-05-11 NOTE — TELEPHONE ENCOUNTER
Patient last seen on 5/4/20: IMP: 54 y. o.female s/p TE reconstruction  PLAN: Doing well. A total of 150cc of saline was filled today bringing the total to 400 cc in the right breast.  (Implant size 475 cc). Will obtain a CTA for potential ISREAL planning. She is leaning toward autologous reconstruction (given her excess abdominal tissue vs implant based reconstruction). Patient had CTA completed on 5/8/2020. Patient would like to know if MD has reviewed this and what her next steps are. She is still nervous about moving forward with ISREAL, but would like to know MD's opinion after reviwing CTA. Routing to MD to review.

## 2020-05-14 ENCOUNTER — TELEPHONE (OUTPATIENT)
Dept: SURGERY | Age: 55
End: 2020-05-14

## 2020-05-14 NOTE — TELEPHONE ENCOUNTER
STP set up a VV and discussed all directions to navigate through my chart and the bruno to download following the virtual visit instructions. Patient is to \"egin visit\" 20 minutes prior to actual virtual visit time and wait for Dr. David Lowery to begin visit. Discussed calling PCP asap to get an appointment due to potential upcoming surgery. COVID protocol discussed with patient but will reiterate once we have an actual surgery date and follow up with all pre op and surgical instructions. Patient voices understanding.       5/20 @ 893

## 2020-05-20 ENCOUNTER — TELEMEDICINE (OUTPATIENT)
Dept: SURGERY | Age: 55
End: 2020-05-20

## 2020-05-20 PROCEDURE — 99024 POSTOP FOLLOW-UP VISIT: CPT | Performed by: SURGERY

## 2020-05-20 NOTE — PROGRESS NOTES
waiver authority and the [employer] and [employer] Act, this Virtual Visit was conducted, with patient's consent, to reduce the patient's risk of exposure to COVID-19 and provide continuity of care for an established patient. Services were provided through a video synchronous discussion virtually to substitute for in-person clinic visit.

## 2020-05-22 ENCOUNTER — TELEPHONE (OUTPATIENT)
Dept: SURGERY | Age: 55
End: 2020-05-22

## 2020-05-22 NOTE — TELEPHONE ENCOUNTER
Pt called in and wants to move forward with scheduling her ISREAL surgery. I went over in detail the extent of this surgery and she is aware and did not wish to have an implant vs. ISREAL at this time. She will be on vacation from 6/20-6/26 so Dr Agnieszka Gates states he has time on 7/2/20 [facility] if you can look at scheduling please? Dr. Norbert Burleson please send over surgery letter?

## 2020-05-24 ENCOUNTER — HOSPITAL ENCOUNTER (EMERGENCY)
Age: 55
Discharge: HOME | End: 2020-05-24
Attending: EMERGENCY MEDICINE
Payer: COMMERCIAL

## 2020-05-24 ENCOUNTER — APPOINTMENT (OUTPATIENT)
Dept: CT IMAGING | Age: 55
End: 2020-05-24
Payer: COMMERCIAL

## 2020-05-24 VITALS
BODY MASS INDEX: 27.28 KG/M2 | HEART RATE: 62 BPM | RESPIRATION RATE: 16 BRPM | TEMPERATURE: 98 F | SYSTOLIC BLOOD PRESSURE: 119 MMHG | DIASTOLIC BLOOD PRESSURE: 68 MMHG | HEIGHT: 68 IN | OXYGEN SATURATION: 99 % | WEIGHT: 180 LBS

## 2020-05-24 PROCEDURE — 99284 EMERGENCY DEPT VISIT MOD MDM: CPT

## 2020-05-24 PROCEDURE — 70450 CT HEAD/BRAIN W/O DYE: CPT

## 2020-05-24 PROCEDURE — 6370000000 HC RX 637 (ALT 250 FOR IP): Performed by: EMERGENCY MEDICINE

## 2020-05-24 RX ORDER — LORAZEPAM 1 MG/1
1 TABLET ORAL ONCE
Status: COMPLETED | OUTPATIENT
Start: 2020-05-24 | End: 2020-05-24

## 2020-05-24 RX ORDER — DOXYCYCLINE HYCLATE 100 MG
100 TABLET ORAL ONCE
Status: COMPLETED | OUTPATIENT
Start: 2020-05-24 | End: 2020-05-24

## 2020-05-24 RX ORDER — DOXYCYCLINE HYCLATE 100 MG
100 TABLET ORAL 2 TIMES DAILY
Qty: 20 TABLET | Refills: 0 | Status: SHIPPED | OUTPATIENT
Start: 2020-05-24 | End: 2020-06-03

## 2020-05-24 RX ORDER — AMOXICILLIN AND CLAVULANATE POTASSIUM 875; 125 MG/1; MG/1
1 TABLET, FILM COATED ORAL ONCE
Status: DISCONTINUED | OUTPATIENT
Start: 2020-05-24 | End: 2020-05-24

## 2020-05-24 RX ADMIN — DOXYCYCLINE HYCLATE 100 MG: 100 TABLET, COATED ORAL at 23:14

## 2020-05-24 RX ADMIN — LORAZEPAM 1 MG: 1 TABLET ORAL at 21:40

## 2020-05-30 NOTE — ED PROVIDER NOTES
CHIEF COMPLAINT  Headache (Had a masectomy march 31 and started tomaxifin which patient states can cause blood clots. other night she noticed pain on the left side of her scalp with warmth and swelling abd became worried. Had a horrible headache yesterday, today it is not as bad. Denies any head injuries. )      HISTORY OF PRESENT ILLNESS  Wilton Dunaway is a 48 y.o. female who presents to the ED with report of pain to the left side of head that is painful to touch. Patient reports she had a mastectomy in March and has been on tamoxifen since then as well. She is concerned she may have a \"blood clot\" in her head as she was reading up on some of the side effects of tamoxifen. Feels like the left side of her head feels swollen and feels warm. Had a bad headache yesterday which is mostly resolved today. Denies any head injuries. No other complaints, modifying factors or associated symptoms. I have reviewed the following from the nursing documentation.     Past Medical History:   Diagnosis Date    Acid reflux     history of no longer a problem    Anemia     Anemia     Blood transfusion     1985    [facility] sites of left female [facility]     Menorrhagia     Obesity     Shingles 5/4/2016    Sleep apnea     LOST WT NO LONGER AN ISSUE     Past Surgical History:   Procedure Laterality Date    BREAST ENHANCEMENT SURGERY Left 3/31/2020    LEFT BREAST RECONSTRUCTION WITH STAGE 1 TISSUE EXPANDER PLACEMENT WITH AUTODERM performed by Naomi Gonzalez MD at [facility].  06/24/2016    laparoscopic    COLONOSCOPY      HYSTERECTOMY  07/03/2017    Robotic assisted total laparoscopic hysterectomy with bilateral salpingectomy    MASTECTOMY Left 3/31/2020    LEFT NIPPLE SPARING MASTECTOMY, SENTINEL NODE BIOPSY/ performed by Wilber Valladares MD at [place] [address]  5/26/2011    [address] [place] [address], HYSTEROSCOPY [facility] ENDOMETRIAL    SLEEVE GASTRECTOMY      TUBAL LIGATION       Family History   Problem Relation Age of Onset    High Blood Pressure Mother     Diabetes Mother     High Cholesterol Mother     Cancer Father         lung    Cancer Maternal Grandmother         breast     Social History     Socioeconomic History    Marital status:      Spouse name: Not on file    Number of children: 3    Years of education: Not on file    Highest education level: Not on file   Occupational History    Not on file   Social Needs    Financial resource strain: Not on file    Food insecurity     Worry: Not on file     Inability: Not on file   [employer] needs     Medical: Not on file     Non-medical: Not on file   Tobacco Use    Smoking status: Never Smoker    Smokeless tobacco: Never Used   Substance and Sexual Activity    Alcohol use: Yes     Comment: OCCASIONAL    Drug use: No    Sexual activity: Yes     Partners: Male   Lifestyle    Physical activity     Days per week: Not on file     Minutes per session: Not on file    Stress: Not on file   Relationships    Social connections     Talks on phone: Not on file     Gets together: Not on file     Attends Anabaptist service: Not on file     Active member of club or organization: Not on file     Attends meetings of clubs or organizations: Not on file     Relationship status: Not on file    Intimate partner violence     Fear of current or ex partner: Not on file     Emotionally abused: Not on file     Physically abused: Not on file     Forced sexual activity: Not on file   Other Topics Concern    Not on file   Social History Narrative    ** Merged History Encounter **          No current facility-administered medications for this encounter.       Current Outpatient Medications   Medication Sig Dispense Refill    TAMOXIFEN CITRATE PO Take by mouth      doxycycline hyclate (VIBRA-TABS) 100 MG tablet Take 1 tablet by mouth 2 times daily for 10 days 20 tablet 0

## 2020-06-29 ENCOUNTER — OFFICE VISIT (OUTPATIENT)
Dept: PRIMARY CARE CLINIC | Age: 55
End: 2020-06-29
Payer: COMMERCIAL

## 2020-06-29 PROCEDURE — 99211 OFF/OP EST MAY X REQ PHY/QHP: CPT | Performed by: NURSE PRACTITIONER

## 2020-07-01 ENCOUNTER — TELEPHONE (OUTPATIENT)
Dept: SURGERY | Age: 55
End: 2020-07-01

## 2020-07-01 NOTE — TELEPHONE ENCOUNTER
BODØ w/ SANDIE states patient's surgery is tomorrow and there is no pre-op or any information on patient. BODØ has been unable to reach patient. Please advise. Thank you!

## 2020-07-02 LAB
SARS-COV-2: NOT DETECTED
SOURCE: NORMAL

## 2020-07-02 NOTE — RESULT ENCOUNTER NOTE
Your test for COVID-19, also known as novel coronavirus, came back negative. No virus was detected from the sample collected. Testing is not 100%. Until your symptoms are fully resolved, you may still be contagious. We recommend that you remain isolated for 7 days minimum or 72 hours after your symptoms have completely resolved, whichever is longer. If you were exposed to a known positive COIVID-19 patient, then you must remain isolated for 14 days. If you were tested for a pre-op, then you remain in isolated until your procedure. Continually monitor symptoms. Contact a medical provider if symptoms are worsening. If you have any additional questions, contact your PCP.     For additional information, please visit the Centers for Disease Control and Prevention [email or web address].cy

## 2020-07-07 ENCOUNTER — TELEPHONE (OUTPATIENT)
Dept: SURGERY | Age: 55
End: 2020-07-07

## 2020-07-07 NOTE — TELEPHONE ENCOUNTER
Patient is scheduled for Co Case Mohamud Alas on 8/4/2020. Patient will be moving forward Right simple prophylactic mastectomy with direct to implant. Routing to MD for surgery letter.

## 2020-07-14 NOTE — TELEPHONE ENCOUNTER
Called lindsey pre-cert and [facility] is required for CPT codes [address], [facility], [id number]. Pending auth # [id number]. Spoke with nurse review and gave all clinical information.   We will be hearing via fax about approval.

## 2020-07-15 ENCOUNTER — TELEPHONE (OUTPATIENT)
Dept: SURGERY | Age: 55
End: 2020-07-15

## 2020-07-15 NOTE — TELEPHONE ENCOUNTER
Gil Garcia w/ scheduling states patient is scheduled for co-case 8/4 and they are needing Dr Mylene Foster portion of information. Please advise. Thank you!

## 2020-07-15 NOTE — TELEPHONE ENCOUNTER
Surgery letter sent to Omar Zaman via Epic. [employer] form was faxed to [facility].

## 2020-07-15 NOTE — TELEPHONE ENCOUNTER
Letter routed to surgery scheduling. [employer] forms faxed to [facility].  DONE

## 2020-07-24 NOTE — PROGRESS NOTES
[facility]) [address], [address] [place] [address]    Acknowledgment of Informed Consent for Surgical or Medical Procedure and Sedation  I agree to allow doctor(s) SUDARSHAN BOND AND Rajan Kline and his/her associates or assistants, including residents and/or other qualified medical practitioner to perform the following medical treatment or procedure and to administer or direct the administration of sedation as necessary:  Procedure(s): RIGHT SIMPLE MASTECTOMY WITH DIRECT TO IMPLANT; EXCHANGE TO PERMANENT IMPLANT LEFT    My doctor has explained the following regarding the proposed procedure:   the explanation of the procedure   the benefits of the procedure   the potential problems that might occur during recuperation   the risks and side effects of the procedure which could include but are not limited to severe blood loss, infection, stroke or death   the benefits, risks and side effect of alternative procedures including the consequences of declining this procedure or any alternative procedures   the likelihood of achieving satisfactory results. I acknowledge no guarantee or assurance has been made to me regarding the results. I understand that during the course of this treatment/procedure, unforeseen conditions can occur which require an additional or different procedure. I agree to allow my physician or assistants to perform such extension of the original procedure as they may find necessary. I understand that sedation will often result in temporary impairment of memory and fine motor skills and that sedation can occasionally progress to a state of deep sedation or general anesthesia. I understand the risks of anesthesia for surgery include, but are not limited to, sore throat, hoarseness, injury to face, mouth, or teeth; nausea; headache; injury to blood vessels or nerves; death, brain damage, or paralysis.     I understand that if I have a Limitation of Treatment order in effect during my hospitalization, the order may or may not be in effect during this procedure. I give my doctor permission to give me blood or blood products. I understand that there are risks with receiving blood such as hepatitis, AIDS, fever, or allergic reaction. I acknowledge that the risks, benefits, and alternatives of this treatment have been explained to me and that no express or implied warranty has been given by the hospital, any blood bank, or any person or entity as to the blood or blood components transfused. At the discretion of my doctor, I agree to allow observers, equipment/product representatives and allow photographing, and/or televising of the procedure, provided my name or identity is maintained confidentially. I agree the hospital may dispose of or use for scientific or educational purposes any tissue, fluid, or body parts which may be removed.     ________________________________Date________Time______ am/pm  ([place] [facility])  Patient or Signature of Closest Relative or Legal Guardian    ________________________________Date________Time______am/pm      Page 1 of  1  Witness

## 2020-07-30 ENCOUNTER — TELEPHONE (OUTPATIENT)
Dept: SURGERY | Age: 55
End: 2020-07-30

## 2020-07-30 ENCOUNTER — NURSE ONLY (OUTPATIENT)
Dept: PRIMARY CARE CLINIC | Age: 55
End: 2020-07-30
Payer: COMMERCIAL

## 2020-07-30 PROCEDURE — 99211 OFF/OP EST MAY X REQ PHY/QHP: CPT | Performed by: NURSE PRACTITIONER

## 2020-07-30 NOTE — PROGRESS NOTES
Late entry 7/27 [facility] to get Covid on 7/29/ ld/ ag    7/30 @ [phone] Spoke w/ pt. States she was told to get tomorrow.   Requested she go to [facility] today before 12pm. Patient agreeable/ag

## 2020-07-30 NOTE — TELEPHONE ENCOUNTER
Received a call from [address] [place] [address],     Patient has  Not completed COVID screening. Patient is scheduled on 8/4/2020 Co Case with Robbi Reyes. LM for patient to return office call. Called Lizeth at [place] office to discuss. LM for her to call.

## 2020-07-30 NOTE — TELEPHONE ENCOUNTER
Patient called office and states that she went to [facility] today. No COVID testing pending yet in Epic. Will hold call open so that we can ensure that this has been done prior to weekend.

## 2020-07-31 ENCOUNTER — ANESTHESIA EVENT (OUTPATIENT)
Dept: OPERATING ROOM | Age: 55
End: 2020-07-31
Payer: COMMERCIAL

## 2020-07-31 LAB
REPORT: NORMAL
SARS-COV-2: NOT DETECTED
THIS TEST SENT TO: NORMAL

## 2020-08-03 RX ORDER — CLINDAMYCIN PHOSPHATE 900 MG/50ML
900 INJECTION INTRAVENOUS ONCE
Status: DISCONTINUED | OUTPATIENT
Start: 2020-08-03 | End: 2020-08-03

## 2020-08-03 RX ORDER — SODIUM CHLORIDE, SODIUM LACTATE, POTASSIUM CHLORIDE, CALCIUM CHLORIDE 600; 310; 30; 20 MG/100ML; MG/100ML; MG/100ML; MG/100ML
INJECTION, SOLUTION INTRAVENOUS CONTINUOUS
Status: DISCONTINUED | OUTPATIENT
Start: 2020-08-03 | End: 2020-08-03

## 2020-08-03 NOTE — PROGRESS NOTES
PT HAS SLEEP APNEA BUT DOES NOT USE A CPAP MACHINE. PT VERBALIZES UNDERSTANDING OF PRE OP INSTRUCTIONS. PT WAS UPSET THAT FAMILY COULD NOT COME BACK INTO [facility] DUE TO COVID PRECAUTIONS.

## 2020-08-03 NOTE — PROGRESS NOTES
[facility] PRE-SURGICAL TESTING INSTRUCTIONS                              PRIOR TO PROCEDURE DATE:  1. Please follow any guidelines/instructions prior to your procedure as advised by your surgeon. 2. Arrange for someone to drive you home and be with you for the first 24 hours after discharge for your safety after your procedure for which you received sedation. Ensure it is someone we can share information with regarding your discharge. 3. You must contact your surgeon for instructions IF:   You are taking any blood thinners, aspirin, anti-inflammatory or vitamin E.   There is a change in your physical condition such as a cold, fever, rash, cuts, sores or any other infection, especially near your surgical site. 4. Do not drink alcohol the day before or day of your procedure. 5. A Pre-op History and Physical for surgery MUST be completed by your Physician or Urgent Care within 30 days of your procedure date. Please bring a copy with you on the day of your procedure and along with any other testing performed. THE DAY OF YOUR PROCEDURE:  1. Follow instructions for ARRIVAL TIME as DIRECTED BY YOUR SURGEON. I    2. Enter the MAIN entrance from [employer] and follow the signs to the free [place] or [employer] parking (offered free of charge 6am-5pm). 3. Enter the Main Entrance of the hospital (do not enter from the lower level of the parking garage). Upon entrance, check in with the  at the main desk on your left. If no one is available at the desk, proceed into the [facility] Waiting Room and go through the door directly into the [facility]. There is a Check-in desk ACROSS from Room 5 (marked with a sign hanging from the ceiling). The phone number for the surgery center is [phone]. 4. Please call [phone] option #2 option #2 if you have not been preregistered yet. On the day of your procedure bring your insurance card and photo ID.  You will be registered at your bedside once brought back to your room. 5. DO NOT EAT ANYTHING eight hours prior to surgery. May have 8 ounces of water 4 hours prior to surgery. 6. MEDICATIONS    Take the following medications with a SMALL sip of water:TAMOXIFEN Use your usual dose of inhalers the morning of surgery. BRING your rescue inhaler with you to hospital.    Anesthesia does NOT want you to take insulin the morning of surgery. They will control your blood sugar while you are at the hospital. Please contact your ordering physician for instructions regarding your insulin the night before your procedure. If you have an insulin pump, please keep it set on basal rate. 7. Do not swallow water when brushing teeth. No gum, candy, mints or ice chips. Refrain from smoking or at least decrease the amount. 8. Dress in loose, comfortable clothing appropriate for redressing after your procedure. Do not wear jewelry (including body piercings), make-up (especially NO eye make-up), fingernail polish (NO toenail polish if foot/leg surgery), lotion, powders or metal hairclips. 9. Dentures, glasses, or contacts will need to be removed before your procedure. Bring cases for your glasses, contacts, dentures, or hearing aids to protect them while you are in surgery. 10. If you use a CPAP, please bring it with you on the day of your procedure. 11. We recommend that valuable personal  belongings such as cash, cell phones, e-tablets or jewelry, be left at home during your stay. The hospital will not be responsible for valuables that are not secured in the hospital safe. However, if your insurance requires a co-pay, you may want to bring a method of payment, i.e. Check or credit card, if you wish to pay your co-pay the day of surgery. 12. If you are to stay overnight, you may bring a bag with personal items.  Please have any large items you may need brought in by your family after your arrival to your hospital room.    13. If you have a Living Will or Durable Power of , please bring a copy on the day of your procedure. 15. With your permission, one family member may accompany you while you are being prepared for surgery. Once you are ready, additional family members may join you. HOW WE KEEP YOU SAFE and WORK TO PREVENT SURGICAL SITE INFECTIONS:  1. Health care workers should always check your ID bracelet to verify your name and birth date. You will be asked many times to state your name, date of birth, and allergies. 2. Health care workers should always clean their hands with soap or alcohol gel before providing care to you. It is okay to ask anyone if they cleaned their hands before they touch you. 3. You will be actively involved in verifying the type of procedure you are having and ensuring the correct surgical site. This will be confirmed multiple times prior to your procedure. Do NOT melina your surgery site UNLESS instructed to by your surgeon. 4. Do not shave or wax for 72 hours prior to procedure near your operative site. Shaving with a razor can irritate your skin and make it easier to develop an infection. On the day of your procedure, any hair that needs to be removed near the surgical site will be clipped by a healthcare worker using a special clippers designed to avoid skin irritation. 5. When you are in the operating room, your surgical site will be cleansed with a special soap, and in most cases, you will be given an antibiotic before the surgery begins. What to expect AFTER YOUR PROCEDURE:  1. Immediately following your procedure, your will be taken to the PACU for the first phase of your recovery. Your nurse will help you recover from any potential side effects of anesthesia, such as extreme drowsiness, changes in your vital signs or breathing patterns. Nausea, headache, muscle aches, or sore throat may also occur after anesthesia.   Your nurse will help you manage these potential side effects. 2. For comfort and safety, arrange to have someone at home with you for the first 24 hours after discharge. 3. You and your family will be given written instructions about your diet, activity, dressing care, medications, and return visits. 4. Once at home, should issues with nausea, pain, or bleeding occur, or should you notice any signs of infection, you should call your surgeon. 5. Always clean your hands before and after caring for your wound. Do not let your family touch your surgery site without cleaning their hands. 6. Narcotic pain medications can cause significant constipation. You may want to add a stool softener to your postoperative medication schedule or speak to your surgeon on how best to manage this SIDE EFFECT. Thank you for allowing us to care for you. We strive to exceed your expectations in the delivery of care and service provided to you and your family. If you need to contact us for any reason, please call us at [phone]    Instructions reviewed with patient during preadmission testing phone interview. Agueda Cantu. 8/3/2020 .1:31 PM      ADDITIONAL EDUCATIONAL INFORMATION REVIEWED PER PHONE WITH YOU AND/OR YOUR FAMILY:  No Bring a urine sample on day of surgery  No Pain Goal-Taking Control of Your Pain  No FAQs about Surgical Site Infections  No Hibiclens® Bathing Instructions   Yes Antibacterial Soap  No Abiodun® Wipes Bathing Instructions (Obtained from: https://www.[employer]/. pdf )  No Incentive Spirometer Education  No Other

## 2020-08-04 ENCOUNTER — ANESTHESIA (OUTPATIENT)
Dept: OPERATING ROOM | Age: 55
End: 2020-08-04
Payer: COMMERCIAL

## 2020-08-04 ENCOUNTER — HOSPITAL ENCOUNTER (OUTPATIENT)
Age: 55
Setting detail: OUTPATIENT SURGERY
Discharge: HOME | End: 2020-08-04
Attending: SURGERY | Admitting: SURGERY
Payer: COMMERCIAL

## 2020-08-04 VITALS — DIASTOLIC BLOOD PRESSURE: 63 MMHG | OXYGEN SATURATION: 100 % | SYSTOLIC BLOOD PRESSURE: 132 MMHG | TEMPERATURE: 98.2 F

## 2020-08-04 VITALS
TEMPERATURE: 97 F | OXYGEN SATURATION: 96 % | HEIGHT: 68 IN | HEART RATE: 64 BPM | RESPIRATION RATE: 22 BRPM | SYSTOLIC BLOOD PRESSURE: 106 MMHG | DIASTOLIC BLOOD PRESSURE: 57 MMHG | BODY MASS INDEX: 27.28 KG/M2 | WEIGHT: 180 LBS

## 2020-08-04 LAB
A/G RATIO: 1.4 (ref 1.1–2.2)
ALBUMIN SERPL-MCNC: 4.2 G/DL (ref 3.4–5)
ALP BLD-CCNC: 56 U/L (ref 40–129)
ALT SERPL-CCNC: 8 U/L (ref 10–40)
ANION GAP SERPL CALCULATED.3IONS-SCNC: 9 MMOL/L (ref 3–16)
APTT: 26.8 SEC (ref 24.2–36.2)
AST SERPL-CCNC: 20 U/L (ref 15–37)
BASOPHILS ABSOLUTE: 0 K/UL (ref 0–0.2)
BASOPHILS RELATIVE PERCENT: 1 %
BILIRUB SERPL-MCNC: <0.2 MG/DL (ref 0–1)
BUN BLDV-MCNC: 11 MG/DL (ref 7–20)
CALCIUM SERPL-MCNC: 8.8 MG/DL (ref 8.3–10.6)
CHLORIDE BLD-SCNC: 102 MMOL/L (ref 99–110)
CO2: 27 MMOL/L (ref 21–32)
CREAT SERPL-MCNC: 0.7 MG/DL (ref 0.6–1.1)
EOSINOPHILS ABSOLUTE: 0.1 K/UL (ref 0–0.6)
EOSINOPHILS RELATIVE PERCENT: 2 %
GFR AFRICAN AMERICAN: >60
GFR NON-AFRICAN AMERICAN: >60
GLOBULIN: 3 G/DL
GLUCOSE BLD-MCNC: 94 MG/DL (ref 70–99)
HCT VFR BLD CALC: 37.7 % (ref 36–48)
HEMOGLOBIN: 12.7 G/DL (ref 12–16)
INR BLD: 0.94 (ref 0.86–1.14)
LYMPHOCYTES ABSOLUTE: 1.6 K/UL (ref 1–5.1)
LYMPHOCYTES RELATIVE PERCENT: 37.1 %
MCH RBC QN AUTO: 26.8 PG (ref 26–34)
MCHC RBC AUTO-ENTMCNC: 33.7 G/DL (ref 31–36)
MCV RBC AUTO: 79.5 FL (ref 80–100)
MONOCYTES ABSOLUTE: 0.3 K/UL (ref 0–1.3)
MONOCYTES RELATIVE PERCENT: 7.3 %
NEUTROPHILS ABSOLUTE: 2.2 K/UL (ref 1.7–7.7)
NEUTROPHILS RELATIVE PERCENT: 52.6 %
PDW BLD-RTO: 15.6 % (ref 12.4–15.4)
PLATELET # BLD: 226 K/UL (ref 135–450)
PMV BLD AUTO: 8.7 FL (ref 5–10.5)
POTASSIUM SERPL-SCNC: 4 MMOL/L (ref 3.5–5.1)
PROTHROMBIN TIME: 10.9 SEC (ref 10–13.2)
RBC # BLD: 4.74 M/UL (ref 4–5.2)
SODIUM BLD-SCNC: 138 MMOL/L (ref 136–145)
TOTAL PROTEIN: 7.2 G/DL (ref 6.4–8.2)
WBC # BLD: 4.2 K/UL (ref 4–11)

## 2020-08-04 PROCEDURE — 2709999900 HC NON-CHARGEABLE SUPPLY: Performed by: SURGERY

## 2020-08-04 PROCEDURE — 2580000003 HC RX 258: Performed by: SURGERY

## 2020-08-04 PROCEDURE — 85730 THROMBOPLASTIN TIME PARTIAL: CPT

## 2020-08-04 PROCEDURE — 2580000003 HC RX 258: Performed by: ANESTHESIOLOGY

## 2020-08-04 PROCEDURE — C1789 PROSTHESIS, BREAST, IMP: HCPCS | Performed by: SURGERY

## 2020-08-04 PROCEDURE — L8000 MASTECTOMY BRA: HCPCS | Performed by: SURGERY

## 2020-08-04 PROCEDURE — C9290 INJ, BUPIVACAINE LIPOSOME: HCPCS | Performed by: SURGERY

## 2020-08-04 PROCEDURE — C1729 CATH, DRAINAGE: HCPCS | Performed by: SURGERY

## 2020-08-04 PROCEDURE — 2720000010 HC SURG SUPPLY STERILE: Performed by: SURGERY

## 2020-08-04 PROCEDURE — 3600000004 HC SURGERY LEVEL 4 BASE: Performed by: SURGERY

## 2020-08-04 PROCEDURE — 6370000000 HC RX 637 (ALT 250 FOR IP): Performed by: SURGERY

## 2020-08-04 PROCEDURE — 2500000003 HC RX 250 WO HCPCS: Performed by: SURGERY

## 2020-08-04 PROCEDURE — 3700000001 HC ADD 15 MINUTES (ANESTHESIA): Performed by: SURGERY

## 2020-08-04 PROCEDURE — 2500000003 HC RX 250 WO HCPCS: Performed by: NURSE ANESTHETIST, CERTIFIED REGISTERED

## 2020-08-04 PROCEDURE — 6360000002 HC RX W HCPCS: Performed by: ANESTHESIOLOGY

## 2020-08-04 PROCEDURE — 7100000000 HC PACU RECOVERY - FIRST 15 MIN: Performed by: SURGERY

## 2020-08-04 PROCEDURE — 3600000014 HC SURGERY LEVEL 4 ADDTL 15MIN: Performed by: SURGERY

## 2020-08-04 PROCEDURE — 6360000002 HC RX W HCPCS: Performed by: SURGERY

## 2020-08-04 PROCEDURE — 80053 COMPREHEN METABOLIC PANEL: CPT

## 2020-08-04 PROCEDURE — 85610 PROTHROMBIN TIME: CPT

## 2020-08-04 PROCEDURE — 19340 INSJ BREAST IMPLT SM D MAST: CPT | Performed by: SURGERY

## 2020-08-04 PROCEDURE — 88300 SURGICAL PATH GROSS: CPT

## 2020-08-04 PROCEDURE — 3700000000 HC ANESTHESIA ATTENDED CARE: Performed by: SURGERY

## 2020-08-04 PROCEDURE — 6360000002 HC RX W HCPCS: Performed by: NURSE ANESTHETIST, CERTIFIED REGISTERED

## 2020-08-04 PROCEDURE — 7100000001 HC PACU RECOVERY - ADDTL 15 MIN: Performed by: SURGERY

## 2020-08-04 PROCEDURE — 88307 TISSUE EXAM BY PATHOLOGIST: CPT

## 2020-08-04 PROCEDURE — 85025 COMPLETE CBC W/AUTO DIFF WBC: CPT

## 2020-08-04 PROCEDURE — 19371 PERI-IMPLT CAPSLC BRST COMPL: CPT | Performed by: SURGERY

## 2020-08-04 DEVICE — SMOOTH MODERATE PLUS PROFILE XTRA 525CC  SMOOTH ROUND SILICONE
Type: IMPLANTABLE DEVICE | Site: BREAST | Status: FUNCTIONAL
Brand: MENTOR MEMORYGEL XTRA BREAST IMPLANT

## 2020-08-04 RX ORDER — METOCLOPRAMIDE HYDROCHLORIDE 5 MG/ML
INJECTION INTRAMUSCULAR; INTRAVENOUS PRN
Status: DISCONTINUED | OUTPATIENT
Start: 2020-08-04 | End: 2020-08-04 | Stop reason: SDUPTHER

## 2020-08-04 RX ORDER — DIAZEPAM 5 MG/1
5 TABLET ORAL EVERY 8 HOURS PRN
Qty: 30 TABLET | Refills: 0 | Status: SHIPPED | OUTPATIENT
Start: 2020-08-04 | End: 2020-08-04 | Stop reason: SDUPTHER

## 2020-08-04 RX ORDER — FENTANYL CITRATE 50 UG/ML
50 INJECTION, SOLUTION INTRAMUSCULAR; INTRAVENOUS EVERY 5 MIN PRN
Status: DISCONTINUED | OUTPATIENT
Start: 2020-08-04 | End: 2020-08-04 | Stop reason: HOSPADM

## 2020-08-04 RX ORDER — SODIUM CHLORIDE, SODIUM LACTATE, POTASSIUM CHLORIDE, CALCIUM CHLORIDE 600; 310; 30; 20 MG/100ML; MG/100ML; MG/100ML; MG/100ML
INJECTION, SOLUTION INTRAVENOUS CONTINUOUS
Status: DISCONTINUED | OUTPATIENT
Start: 2020-08-04 | End: 2020-08-04 | Stop reason: HOSPADM

## 2020-08-04 RX ORDER — LABETALOL 20 MG/4 ML (5 MG/ML) INTRAVENOUS SYRINGE
5 EVERY 10 MIN PRN
Status: DISCONTINUED | OUTPATIENT
Start: 2020-08-04 | End: 2020-08-04 | Stop reason: HOSPADM

## 2020-08-04 RX ORDER — CEPHALEXIN 500 MG/1
500 CAPSULE ORAL 4 TIMES DAILY
Qty: 40 CAPSULE | Refills: 0 | Status: SHIPPED | OUTPATIENT
Start: 2020-08-04 | End: 2020-08-04 | Stop reason: SDUPTHER

## 2020-08-04 RX ORDER — DIAZEPAM 5 MG/1
5 TABLET ORAL EVERY 8 HOURS PRN
Qty: 12 TABLET | Refills: 0 | Status: SHIPPED | OUTPATIENT
Start: 2020-08-04 | End: 2020-08-08

## 2020-08-04 RX ORDER — LIDOCAINE HYDROCHLORIDE 10 MG/ML
1 INJECTION, SOLUTION EPIDURAL; INFILTRATION; INTRACAUDAL; PERINEURAL
Status: DISCONTINUED | OUTPATIENT
Start: 2020-08-04 | End: 2020-08-04 | Stop reason: HOSPADM

## 2020-08-04 RX ORDER — DIPHENHYDRAMINE HYDROCHLORIDE 50 MG/ML
INJECTION INTRAMUSCULAR; INTRAVENOUS PRN
Status: DISCONTINUED | OUTPATIENT
Start: 2020-08-04 | End: 2020-08-04 | Stop reason: SDUPTHER

## 2020-08-04 RX ORDER — CLINDAMYCIN PHOSPHATE 600 MG/50ML
600 INJECTION INTRAVENOUS ONCE
Status: CANCELLED | OUTPATIENT
Start: 2020-08-04 | End: 2020-08-04

## 2020-08-04 RX ORDER — CLINDAMYCIN PHOSPHATE 900 MG/50ML
900 INJECTION INTRAVENOUS ONCE
Status: COMPLETED | OUTPATIENT
Start: 2020-08-04 | End: 2020-08-04

## 2020-08-04 RX ORDER — HYDROMORPHONE HCL 110MG/55ML
PATIENT CONTROLLED ANALGESIA SYRINGE INTRAVENOUS PRN
Status: DISCONTINUED | OUTPATIENT
Start: 2020-08-04 | End: 2020-08-04 | Stop reason: SDUPTHER

## 2020-08-04 RX ORDER — ROCURONIUM BROMIDE 10 MG/ML
INJECTION, SOLUTION INTRAVENOUS PRN
Status: DISCONTINUED | OUTPATIENT
Start: 2020-08-04 | End: 2020-08-04 | Stop reason: SDUPTHER

## 2020-08-04 RX ORDER — SODIUM CHLORIDE 0.9 % (FLUSH) 0.9 %
10 SYRINGE (ML) INJECTION EVERY 12 HOURS SCHEDULED
Status: DISCONTINUED | OUTPATIENT
Start: 2020-08-04 | End: 2020-08-04 | Stop reason: HOSPADM

## 2020-08-04 RX ORDER — ONDANSETRON 2 MG/ML
4 INJECTION INTRAMUSCULAR; INTRAVENOUS
Status: COMPLETED | OUTPATIENT
Start: 2020-08-04 | End: 2020-08-04

## 2020-08-04 RX ORDER — HYDRALAZINE HYDROCHLORIDE 20 MG/ML
5 INJECTION INTRAMUSCULAR; INTRAVENOUS EVERY 5 MIN PRN
Status: DISCONTINUED | OUTPATIENT
Start: 2020-08-04 | End: 2020-08-04 | Stop reason: HOSPADM

## 2020-08-04 RX ORDER — LIDOCAINE HYDROCHLORIDE 20 MG/ML
INJECTION, SOLUTION INFILTRATION; PERINEURAL PRN
Status: DISCONTINUED | OUTPATIENT
Start: 2020-08-04 | End: 2020-08-04 | Stop reason: SDUPTHER

## 2020-08-04 RX ORDER — METOPROLOL TARTRATE 5 MG/5ML
INJECTION INTRAVENOUS PRN
Status: DISCONTINUED | OUTPATIENT
Start: 2020-08-04 | End: 2020-08-04 | Stop reason: SDUPTHER

## 2020-08-04 RX ORDER — ENALAPRILAT 2.5 MG/2ML
1.25 INJECTION INTRAVENOUS
Status: DISCONTINUED | OUTPATIENT
Start: 2020-08-04 | End: 2020-08-04 | Stop reason: HOSPADM

## 2020-08-04 RX ORDER — SODIUM CHLORIDE 0.9 % (FLUSH) 0.9 %
10 SYRINGE (ML) INJECTION PRN
Status: DISCONTINUED | OUTPATIENT
Start: 2020-08-04 | End: 2020-08-04 | Stop reason: HOSPADM

## 2020-08-04 RX ORDER — FENTANYL CITRATE 50 UG/ML
25 INJECTION, SOLUTION INTRAMUSCULAR; INTRAVENOUS EVERY 5 MIN PRN
Status: DISCONTINUED | OUTPATIENT
Start: 2020-08-04 | End: 2020-08-04 | Stop reason: HOSPADM

## 2020-08-04 RX ORDER — EPHEDRINE SULFATE 50 MG/ML
INJECTION INTRAVENOUS PRN
Status: DISCONTINUED | OUTPATIENT
Start: 2020-08-04 | End: 2020-08-04 | Stop reason: SDUPTHER

## 2020-08-04 RX ORDER — PROPOFOL 10 MG/ML
INJECTION, EMULSION INTRAVENOUS PRN
Status: DISCONTINUED | OUTPATIENT
Start: 2020-08-04 | End: 2020-08-04 | Stop reason: SDUPTHER

## 2020-08-04 RX ORDER — OXYCODONE HYDROCHLORIDE AND ACETAMINOPHEN 5; 325 MG/1; MG/1
1 TABLET ORAL EVERY 6 HOURS PRN
Qty: 20 TABLET | Refills: 0 | Status: SHIPPED | OUTPATIENT
Start: 2020-08-04 | End: 2020-08-09

## 2020-08-04 RX ORDER — ONDANSETRON 2 MG/ML
INJECTION INTRAMUSCULAR; INTRAVENOUS PRN
Status: DISCONTINUED | OUTPATIENT
Start: 2020-08-04 | End: 2020-08-04 | Stop reason: SDUPTHER

## 2020-08-04 RX ORDER — CEPHALEXIN 500 MG/1
500 CAPSULE ORAL 4 TIMES DAILY
Qty: 40 CAPSULE | Refills: 0 | Status: SHIPPED | OUTPATIENT
Start: 2020-08-04 | End: 2020-08-14

## 2020-08-04 RX ORDER — OXYCODONE HYDROCHLORIDE AND ACETAMINOPHEN 5; 325 MG/1; MG/1
1 TABLET ORAL EVERY 6 HOURS PRN
Qty: 28 TABLET | Refills: 0 | Status: SHIPPED | OUTPATIENT
Start: 2020-08-04 | End: 2020-08-04 | Stop reason: SDUPTHER

## 2020-08-04 RX ORDER — GLYCOPYRROLATE 0.2 MG/ML
INJECTION INTRAMUSCULAR; INTRAVENOUS PRN
Status: DISCONTINUED | OUTPATIENT
Start: 2020-08-04 | End: 2020-08-04 | Stop reason: SDUPTHER

## 2020-08-04 RX ADMIN — SODIUM CHLORIDE, SODIUM LACTATE, POTASSIUM CHLORIDE, AND CALCIUM CHLORIDE: 600; 310; 30; 20 INJECTION, SOLUTION INTRAVENOUS at 11:31

## 2020-08-04 RX ADMIN — EPHEDRINE SULFATE 15 MG: 50 INJECTION INTRAVENOUS at 12:39

## 2020-08-04 RX ADMIN — HYDROMORPHONE HYDROCHLORIDE 0.5 MG: 1 INJECTION, SOLUTION INTRAMUSCULAR; INTRAVENOUS; SUBCUTANEOUS at 16:40

## 2020-08-04 RX ADMIN — METOPROLOL TARTRATE 2 MG: 5 INJECTION, SOLUTION INTRAVENOUS at 13:04

## 2020-08-04 RX ADMIN — ONDANSETRON 4 MG: 2 INJECTION INTRAMUSCULAR; INTRAVENOUS at 16:39

## 2020-08-04 RX ADMIN — NEOSTIGMINE METHYLSULFATE 3 MG: 1 INJECTION INTRAMUSCULAR; INTRAVENOUS; SUBCUTANEOUS at 15:14

## 2020-08-04 RX ADMIN — EPHEDRINE SULFATE 10 MG: 50 INJECTION INTRAVENOUS at 12:32

## 2020-08-04 RX ADMIN — PROPOFOL 200 MG: 10 INJECTION, EMULSION INTRAVENOUS at 12:17

## 2020-08-04 RX ADMIN — HYDROMORPHONE HYDROCHLORIDE 2 MG: 2 INJECTION, SOLUTION INTRAMUSCULAR; INTRAVENOUS; SUBCUTANEOUS at 12:17

## 2020-08-04 RX ADMIN — METOCLOPRAMIDE 10 MG: 5 INJECTION, SOLUTION INTRAMUSCULAR; INTRAVENOUS at 13:18

## 2020-08-04 RX ADMIN — ROCURONIUM BROMIDE 100 MG: 10 INJECTION INTRAVENOUS at 12:17

## 2020-08-04 RX ADMIN — EPHEDRINE SULFATE 25 MG: 50 INJECTION INTRAVENOUS at 12:28

## 2020-08-04 RX ADMIN — SODIUM CHLORIDE, SODIUM LACTATE, POTASSIUM CHLORIDE, AND CALCIUM CHLORIDE: 600; 310; 30; 20 INJECTION, SOLUTION INTRAVENOUS at 12:42

## 2020-08-04 RX ADMIN — GLYCOPYRROLATE 0.3 MG: 0.2 INJECTION INTRAMUSCULAR; INTRAVENOUS at 12:58

## 2020-08-04 RX ADMIN — SODIUM CHLORIDE, POTASSIUM CHLORIDE, SODIUM LACTATE AND CALCIUM CHLORIDE: 600; 310; 30; 20 INJECTION, SOLUTION INTRAVENOUS at 11:15

## 2020-08-04 RX ADMIN — FAMOTIDINE 20 MG: 10 INJECTION, SOLUTION INTRAVENOUS at 12:17

## 2020-08-04 RX ADMIN — ROCURONIUM BROMIDE 50 MG: 10 INJECTION INTRAVENOUS at 13:55

## 2020-08-04 RX ADMIN — PROPOFOL 100 MG: 10 INJECTION, EMULSION INTRAVENOUS at 15:14

## 2020-08-04 RX ADMIN — CLINDAMYCIN PHOSPHATE 900 MG: 18 INJECTION, SOLUTION INTRAVENOUS at 12:21

## 2020-08-04 RX ADMIN — DIPHENHYDRAMINE HYDROCHLORIDE 12.5 MG: 50 INJECTION, SOLUTION INTRAMUSCULAR; INTRAVENOUS at 13:21

## 2020-08-04 RX ADMIN — GLYCOPYRROLATE 0.4 MG: 0.2 INJECTION INTRAMUSCULAR; INTRAVENOUS at 15:14

## 2020-08-04 RX ADMIN — ONDANSETRON 4 MG: 2 INJECTION INTRAMUSCULAR; INTRAVENOUS at 12:17

## 2020-08-04 RX ADMIN — LIDOCAINE HYDROCHLORIDE 100 MG: 20 INJECTION, SOLUTION INFILTRATION; PERINEURAL at 12:17

## 2020-08-04 ASSESSMENT — PULMONARY FUNCTION TESTS
PIF_VALUE: 18
PIF_VALUE: 19
PIF_VALUE: 17
PIF_VALUE: 16
PIF_VALUE: 18
PIF_VALUE: 0
PIF_VALUE: 16
PIF_VALUE: 18
PIF_VALUE: 19
PIF_VALUE: 22
PIF_VALUE: 19
PIF_VALUE: 0
PIF_VALUE: 18
PIF_VALUE: 18
PIF_VALUE: 22
PIF_VALUE: 19
PIF_VALUE: 18
PIF_VALUE: 20
PIF_VALUE: 19
PIF_VALUE: 16
PIF_VALUE: 19
PIF_VALUE: 17
PIF_VALUE: 19
PIF_VALUE: 16
PIF_VALUE: 19
PIF_VALUE: 19
PIF_VALUE: 18
PIF_VALUE: 19
PIF_VALUE: 14
PIF_VALUE: 19
PIF_VALUE: 13
PIF_VALUE: 16
PIF_VALUE: 18
PIF_VALUE: 19
PIF_VALUE: 18
PIF_VALUE: 17
PIF_VALUE: 13
PIF_VALUE: 19
PIF_VALUE: 16
PIF_VALUE: 19
PIF_VALUE: 19
PIF_VALUE: 17
PIF_VALUE: 22
PIF_VALUE: 19
PIF_VALUE: 18
PIF_VALUE: 18
PIF_VALUE: 0
PIF_VALUE: 22
PIF_VALUE: 16
PIF_VALUE: 15
PIF_VALUE: 15
PIF_VALUE: 19
PIF_VALUE: 18
PIF_VALUE: 19
PIF_VALUE: 19
PIF_VALUE: 18
PIF_VALUE: 17
PIF_VALUE: 1
PIF_VALUE: 20
PIF_VALUE: 19
PIF_VALUE: 13
PIF_VALUE: 19
PIF_VALUE: 16
PIF_VALUE: 19
PIF_VALUE: 19
PIF_VALUE: 21
PIF_VALUE: 19
PIF_VALUE: 17
PIF_VALUE: 16
PIF_VALUE: 19
PIF_VALUE: 19
PIF_VALUE: 1
PIF_VALUE: 16
PIF_VALUE: 17
PIF_VALUE: 19
PIF_VALUE: 22
PIF_VALUE: 15
PIF_VALUE: 19
PIF_VALUE: 19
PIF_VALUE: 16
PIF_VALUE: 19
PIF_VALUE: 20
PIF_VALUE: 18
PIF_VALUE: 16
PIF_VALUE: 19
PIF_VALUE: 19
PIF_VALUE: 17
PIF_VALUE: 20
PIF_VALUE: 16
PIF_VALUE: 0
PIF_VALUE: 19
PIF_VALUE: 17
PIF_VALUE: 19
PIF_VALUE: 22
PIF_VALUE: 20
PIF_VALUE: 20
PIF_VALUE: 17
PIF_VALUE: 17
PIF_VALUE: 18
PIF_VALUE: 7
PIF_VALUE: 17
PIF_VALUE: 20
PIF_VALUE: 18
PIF_VALUE: 19
PIF_VALUE: 15
PIF_VALUE: 16
PIF_VALUE: 19
PIF_VALUE: 18
PIF_VALUE: 20
PIF_VALUE: 18
PIF_VALUE: 19
PIF_VALUE: 19
PIF_VALUE: 18
PIF_VALUE: 19
PIF_VALUE: 16
PIF_VALUE: 18
PIF_VALUE: 16
PIF_VALUE: 17
PIF_VALUE: 16
PIF_VALUE: 18
PIF_VALUE: 15
PIF_VALUE: 20
PIF_VALUE: 16
PIF_VALUE: 16
PIF_VALUE: 19
PIF_VALUE: 19
PIF_VALUE: 21
PIF_VALUE: 19
PIF_VALUE: 16
PIF_VALUE: 16
PIF_VALUE: 20
PIF_VALUE: 19
PIF_VALUE: 19
PIF_VALUE: 18
PIF_VALUE: 19
PIF_VALUE: 16
PIF_VALUE: 16
PIF_VALUE: 20
PIF_VALUE: 16
PIF_VALUE: 0
PIF_VALUE: 16
PIF_VALUE: 19
PIF_VALUE: 19
PIF_VALUE: 20
PIF_VALUE: 18
PIF_VALUE: 19
PIF_VALUE: 17
PIF_VALUE: 19
PIF_VALUE: 20
PIF_VALUE: 19
PIF_VALUE: 16
PIF_VALUE: 16
PIF_VALUE: 17
PIF_VALUE: 20
PIF_VALUE: 19
PIF_VALUE: 17
PIF_VALUE: 16
PIF_VALUE: 15
PIF_VALUE: 19
PIF_VALUE: 19
PIF_VALUE: 21
PIF_VALUE: 18
PIF_VALUE: 18
PIF_VALUE: 17
PIF_VALUE: 17
PIF_VALUE: 18
PIF_VALUE: 6
PIF_VALUE: 16
PIF_VALUE: 16
PIF_VALUE: 18
PIF_VALUE: 18
PIF_VALUE: 19

## 2020-08-04 ASSESSMENT — PAIN SCALES - GENERAL
PAINLEVEL_OUTOF10: 4
PAINLEVEL_OUTOF10: 7

## 2020-08-04 ASSESSMENT — PAIN - FUNCTIONAL ASSESSMENT: PAIN_FUNCTIONAL_ASSESSMENT: 0-10

## 2020-08-04 ASSESSMENT — PAIN DESCRIPTION - DESCRIPTORS: DESCRIPTORS: ACHING;DISCOMFORT

## 2020-08-04 ASSESSMENT — PAIN DESCRIPTION - ORIENTATION: ORIENTATION: RIGHT

## 2020-08-04 ASSESSMENT — PAIN DESCRIPTION - PAIN TYPE: TYPE: SURGICAL PAIN

## 2020-08-04 ASSESSMENT — PAIN DESCRIPTION - PROGRESSION: CLINICAL_PROGRESSION: GRADUALLY WORSENING

## 2020-08-04 ASSESSMENT — PAIN DESCRIPTION - LOCATION: LOCATION: BREAST

## 2020-08-04 ASSESSMENT — PAIN DESCRIPTION - ONSET: ONSET: GRADUAL

## 2020-08-04 ASSESSMENT — PAIN DESCRIPTION - FREQUENCY: FREQUENCY: INTERMITTENT

## 2020-08-04 NOTE — BRIEF OP NOTE
Brief Postoperative Note      Patient: Sarah Webb  YOB: 1965  MRN: [id number]    Date of Procedure: 8/4/2020    Pre-Op Diagnosis: Malignant neoplasm of overlapping sites of left female breast, unspecified estrogen receptor status [facility] [C50.812]    Post-Op Diagnosis: Same       Procedure(s):  RIGHT SIMPLE PROPHYLACTIC MASTECTOMY  RIGHT DIRECT TO IMPLANT RECONSTRUCTION WITH ALLODERM, POSSIBLE BILATERAL;  POSSIBLE RIGHT BREAST RECONSTRUCTION WITH STAGE I TISSUE EXPANDER PLACEMENT WITH ALLODERM    Surgeon(s):  MD Dixie Condon MD    Assistant:  Surgical Assistant: Narayan Wan    Anesthesia: General    Estimated Blood Loss (mL): <225 ML    Complications: None    Specimens:   ID Type Source Tests Collected by Time Destination   A : RIGHT BREAST  Tissue Tissue SURGICAL PATHOLOGY Rani Winn MD 8/4/2020 1356        Implants:  * No implants in log *      Drains:   [REMOVED] Closed/Suction Drain Lateral;Left Chest Bulb 15 Martiniquais (Removed)       Findings: NORMAL APPEARING BREAST TISSUE    Electronically signed by Rani Winn MD on 8/4/2020 at 2:06 PM

## 2020-08-04 NOTE — ANESTHESIA POSTPROCEDURE EVALUATION
Department of Anesthesiology  Postprocedure Note    Patient: Cleaster Kawasaki  MRN: [id number]  YOB: 1965  Date of evaluation: 8/4/2020  Time:  4:38 PM     Procedure Summary     Date:  08/04/20 Room / Location:  [address] [place] [address] [address]    Anesthesia Start:  1060 Anesthesia Stop:  3550    Procedures:       RIGHT SIMPLE PROPHYLACTIC MASTECTOMY (Right Breast)      RIGHT DIRECT TO IMPLANT RECONSTRUCTION WITH AUTODERM (N/A Breast)      LEFT EXCHANGE FOR PERMANENT IMPLANT (Right Breast) Diagnosis:       Malignant neoplasm of overlapping sites of left female breast, unspecified estrogen receptor status ([facility]      (Malignant neoplasm of overlapping sites of left female breast, unspecified estrogen receptor status ([facility] [C50.812])    Surgeon:  Jackson Vance MD; Kris Valladares MD Responsible Provider:  Janel Nguyen MD    Anesthesia Type:  general ASA Status:  3          Anesthesia Type: general    Salma Phase I: Salma Score: 9    Salma Phase II:      Last vitals: Reviewed and per EMR flowsheets.        Anesthesia Post Evaluation    Patient location during evaluation: PACU  Patient participation: complete - patient participated  Level of consciousness: awake  Pain score: 2  Airway patency: patent  Nausea & Vomiting: no nausea and no vomiting  Complications: no  Cardiovascular status: hemodynamically stable  Respiratory status: acceptable  Hydration status: euvolemic

## 2020-08-04 NOTE — PROGRESS NOTES
Ambulatory Surgery/Procedure Discharge Note    Vitals:    08/04/20 1800   BP: (!) 106/57 b/p meets judah discharge criteria   Pulse: 64   Resp: 22   Temp: 97 °F (36.1 °C)   SpO2: 96%       In: 845 [I.V.:845]  Out: 50 [Drains:50]    Restroom use offered before discharge. yes  Pain assessment:  None-reports tolerable  Pain Level: 4        Patient discharged to home/self care.  Patient discharged via wheel chair by transporter to waiting family/S.O.       8/4/2020 6:42 PM

## 2020-08-04 NOTE — H&P
OTHER SURGICAL HISTORY  5/26/2011    DILATATION AND CURETTAGE, HYSTEROSCOPY NOVASURE ENDOMETRIAL    SLEEVE GASTRECTOMY      TUBAL LIGATION       Past Social History:  Social History     Socioeconomic History    Marital status:      Spouse name: None    Number of children: 3    Years of education: None    Highest education level: None   Occupational History    None   Social Needs    Financial resource strain: None    Food insecurity     Worry: None     Inability: None    Transportation needs     Medical: None     Non-medical: None   Tobacco Use    Smoking status: Never Smoker    Smokeless tobacco: Never Used   Substance and Sexual Activity    Alcohol use: Yes     Comment: OCCASIONAL    Drug use: No    Sexual activity: Yes     Partners: Male   Lifestyle    Physical activity     Days per week: None     Minutes per session: None    Stress: None   Relationships    Social connections     Talks on phone: None     Gets together: None     Attends [employer] service: None     Active member of club or organization: None     Attends meetings of clubs or organizations: None     Relationship status: None    Intimate partner violence     Fear of current or ex partner: None     Emotionally abused: None     Physically abused: None     Forced sexual activity: None   Other Topics Concern    None   Social History Narrative    ** Merged History Encounter **              Medications Prior to Admission:      Prior to Admission medications    Medication Sig Start Date End Date Taking? Authorizing Provider   TAMOXIFEN CITRATE PO Take by mouth   Yes Historical Provider, MD   traZODone (DESYREL) 50 MG tablet Take 50 mg by mouth nightly   Yes Historical Provider, MD   Multiple Vitamins-Minerals (THERAPEUTIC MULTIVITAMIN-MINERALS) tablet Take 1 tablet by mouth daily.    Yes Historical Provider, MD   ibuprofen (ADVIL;MOTRIN) 600 MG tablet Take 1 tablet by mouth every 6 hours as needed for Pain 7/3/17   Jessee Anguiano Alex Rowan MD   ondansetron [facility]) 4 MG tablet Take 1 tablet by mouth every 8 hours as needed for Nausea or Vomiting 6/3/16   Ida Melendez MD   albuterol sulfate  (90 BASE) MCG/ACT inhaler Inhale 2 puffs into the lungs every 6 hours as needed for Wheezing    Historical Provider, MD         Allergies:  Cephalexin    PHYSICAL EXAM:      /71   Pulse 63   Temp 98 °F (36.7 °C) (Oral)   Resp 18   Ht 5' 8\" (1.727 m)   Wt 180 lb (81.6 kg)   LMP 06/13/2017   SpO2 99%   BMI 27.37 kg/m²      Airway:  Airway patent with no audible stridor    Heart:  regular rate and rhythm, No murmur noted    Lungs:  No increased work of breathing, good air exchange, clear to auscultation bilaterally, no crackles or wheezing    Abdomen:  soft, non-distended, non-tender, no rebound tenderness or guarding, normal active bowel sounds and no masses palpated    ASSESSMENT AND PLAN     Patient is a 48 y.o. female with above specified procedure planned. 1.  Patient seen and focused exam done today- no new changes since last physical exam on 7-    2. Access to ancillary services are available per request of the provider.     Sherrie Ramos     8/4/2020

## 2020-08-04 NOTE — PROGRESS NOTES
D/C instructions explained to patient's , verbalized an understanding. Stated he spoke with both  and Alexis Munoz.

## 2020-08-04 NOTE — OP NOTE
extent of the previous left inframammary incision was opened. The tissue dissected down to the capsule, which was incised allowing entry to the pocket. The expander was evacuated of saline and removed. Sizers were placed and it was noted that the inferior aspect of the breast was significantly lower than the contralateral right side. Given this finding, multiple rows of capsulorrhaphy sutures using 2-0 PDS sutures were performed. There was improvement with elevation of the IMF on this side. Multiple sizers were utilized to find the best fit. Once the appropriate size was selected, attention was directed to the contralateral right breast.    Hemostasis on the right breast was obtained with electrocautery. The inferior breast skin was de-epithelilized in a Toscano pattern to create Autoderm. While adding 45 minutes and additional complexity, the Autoderm allowed for complete coverage of the implant with a vascularized flap all the while not requiring the utilization of an acellular dermal matrix. Once completed, the pectoralis major muscle was then elevated off the chest wall also using electrocautery. Clips were utilized for perforating vessels. An Exparel breast block totaling 30cc was then performed. Hemostasis was then reassessed after the wound was copiously irrigated. The cut end of the pectoralis major muscle was then sutured to the superior aspect of the Autoderm using 2-0 Vicryl sutures. The lateral and medial aspects were kept open. After being copiously irrigated, the implant was placed into the pocket using a Fortune funnel using a no touch technique after gloves were changed. The lateral aspect was then sutured to the chest wall to decrease mobilization of the implant laterally. The most medial aspect was kept open to allow for drainage. A 15 drain was then placed and brought out through a separate stab incision and placed around the mastectomy defect as well as within the expander pocket.   This was secured in place using a 3-0 Nylon suture. There was no apparent tension on the mastectomy skin flaps. The wound was then closed in layers after hemostasis was again checked using 3-0 Monocryl suture. A dressing was then applied. Attention was then directed back to the left breast and the sizer was removed. The pocket was copiously irrigated with dilute Betadine followed by triple antibiotic solution. The left implant was brought to the field and then placed in triple antibiotic solution. Using a no-touch technique with the Fortune funnel, the implant was then placed into the left pocket. The tissue was closed in layers using 3-0 Monocryl followed by a sterile dressing. The patient was then awakened, extubated, and taken to the PACU in stable condition. At the end of the case, all counts were correct and there were no immediate complications. The patient tolerated the procedure well. DRAINS: 1 (15F round on the right breast)    SPECIMEN: None from plastics    CONDITION: Stable    IMPLANTS:   (Right) 525 cc [place] MemoryGel, Smooth, Round, Moderate Plus Xtra Breast Implants, reference# [id number], lot# [id number]. (Left)   525 cc [place] MemoryGel, Smooth, Round, Moderate Plus Xtra Breast Implants, reference# TEXE-[id number], lot# [id number].     Thais Peterson

## 2020-08-04 NOTE — PROGRESS NOTES
Pt admitted to PACU 16 from OR in stable condition. PACU monitoring devices in place. Report received at bedside from CRNA, no intraoperative complications.  Pt denies pain/nausea on arrvial.

## 2020-08-04 NOTE — PROGRESS NOTES
Slept for past hour, states she feels much better. Understands how to empty drain as she had one in March. Patient wishes to go home,  called earlier and updated.

## 2020-08-04 NOTE — ANESTHESIA PRE PROCEDURE
Department of Anesthesiology  Preprocedure Note       Name:  James Pham   Age:  48 y.o.  :  1965                                          MRN:  [id number]         Date:  2020      Surgeon: Imtiaz Nogueira):  MD Bill Krause MD    Procedure: Procedure(s):  RIGHT NIPPLE SPARING PROPHYLACTIC MASTECTOMY  RIGHT DIRECT TO IMPLANT RECONSTRUCTION WITH ALLODERM, POSSIBLE BILATERAL;  POSSIBLE RIGHT BREAST RECONSTRUCTION WITH STAGE I TISSUE EXPANDER PLACEMENT WITH ALLODERM    Medications prior to admission:   Prior to Admission medications    Medication Sig Start Date End Date Taking? Authorizing Provider   TAMOXIFEN CITRATE PO Take by mouth   Yes Historical Provider, MD   traZODone (DESYREL) 50 MG tablet Take 50 mg by mouth nightly   Yes Historical Provider, MD   Multiple Vitamins-Minerals (THERAPEUTIC MULTIVITAMIN-MINERALS) tablet Take 1 tablet by mouth daily. Yes Historical Provider, MD   ibuprofen (ADVIL;MOTRIN) 600 MG tablet Take 1 tablet by mouth every 6 hours as needed for Pain 7/3/17   Peri Yusuf MD   ondansetron [facility]) 4 MG tablet Take 1 tablet by mouth every 8 hours as needed for Nausea or Vomiting 6/3/16   Rodriguez Quintero MD   albuterol sulfate  (90 BASE) MCG/ACT inhaler Inhale 2 puffs into the lungs every 6 hours as needed for Wheezing    Historical Provider, MD       Current medications:    Current Facility-Administered Medications   Medication Dose Route Frequency Provider Last Rate Last Dose    lactated ringers infusion   Intravenous Continuous Francia Dhillon MD        sodium chloride flush 0.9 % injection 10 mL  10 mL Intravenous 2 times per day Francia Dhillon MD        sodium chloride flush 0.9 % injection 10 mL  10 mL Intravenous PRN Francia Dhillon MD        lidocaine PF 1 % injection 1 mL  1 mL Intradermal Once PRN Francia Dhillon MD           Allergies:     Allergies   Allergen Reactions    Cephalexin Rash     Patient denies 5' 8\" (1.727 m)                                              BP Readings from Last 3 Encounters:   08/04/20 128/71   05/24/20 119/68   05/04/20 111/71       NPO Status:                                                                                 BMI:   Wt Readings from Last 3 Encounters:   08/04/20 180 lb (81.6 kg)   05/24/20 180 lb (81.6 kg)   05/04/20 182 lb 12.8 oz (82.9 kg)     Body mass index is 27.37 kg/m². CBC:   Lab Results   Component Value Date    WBC 5.0 07/03/2017    RBC 4.71 07/03/2017    HGB 11.7 07/03/2017    HCT 35.5 07/03/2017    MCV 75.4 07/03/2017    RDW 13.8 07/03/2017     07/03/2017       CMP:   Lab Results   Component Value Date     06/02/2016    K 3.9 06/02/2016     06/02/2016    CO2 26 06/02/2016    BUN 6 06/02/2016    CREATININE <0.5 06/02/2016    GFRAA >60 06/02/2016    GFRAA >60 02/04/2012    AGRATIO 1.4 06/01/2016    LABGLOM >60 06/02/2016    GLUCOSE 85 06/02/2016    PROT 6.5 06/24/2016    PROT 8.0 02/04/2012    CALCIUM 8.0 06/02/2016    BILITOT <0.2 06/24/2016    ALKPHOS 58 06/24/2016    AST 16 06/24/2016    ALT 11 06/24/2016       POC Tests: No results for input(s): POCGLU, POCNA, POCK, POCCL, POCBUN, POCHEMO, POCHCT in the last 72 hours.     Coags:   Lab Results   Component Value Date    PROTIME 11.1 03/31/2020    INR 0.96 03/31/2020    APTT 29.7 03/31/2020       HCG (If Applicable):   Lab Results   Component Value Date    PREGTESTUR Negative 07/03/2017        ABGs: No results found for: PHART, PO2ART, KBM9LHX, KBJ3QND, BEART, R7BLOHBQ     Type & Screen (If Applicable):  No results found for: LABABO, LABRH    Drug/Infectious Status (If Applicable):  No results found for: HIV, HEPCAB    COVID-19 Screening (If Applicable):   Lab Results   Component Value Date    COVID19 Not Detected 07/30/2020         Anesthesia Evaluation  Patient summary reviewed no history of anesthetic complications:   Airway: Mallampati: II  TM distance: >3 FB   Neck ROM: full  Mouth opening: > = 3 FB Dental: normal exam         Pulmonary:                              Cardiovascular:Negative CV ROS                      Neuro/Psych:               GI/Hepatic/Renal:   (+) GERD:,          ROS comment: Gastric sleeve surgery and JATINDER resolved. Endo/Other:                      ROS comment: Breast CA Abdominal:           Vascular:                                        Anesthesia Plan      general     ASA 3       Induction: intravenous. Anesthetic plan and risks discussed with patient.                       Candy Rider MD   8/4/2020

## 2020-08-04 NOTE — H&P
The patient was identified and examined. The surgical site was marked . No interval changes in health status since H&P was performed. The patient is cleared to proceed as scheduled.

## 2020-08-05 ENCOUNTER — TELEPHONE (OUTPATIENT)
Dept: SURGERY | Age: 55
End: 2020-08-05

## 2020-08-05 NOTE — OP NOTE
oriented  using a MarginMap, weighed and submitted in formalin for permanent  section. Care of the patient was then transferred to Dr. Beckie Olszewski for  first-stage reconstruction and tissue expander exchange on the left. The patient was in stable condition at the conclusion of the first part  of the operation. The second part of the operation is dictated under  separate cover by Dr. Jillian Aragon.         Kathy Sorensen MD    D: 08/04/2020 14:32:10       T: 08/04/2020 14:37:46     [email or web address]  Job#: [id number]     Doc#: [id number]    CC:

## 2020-08-11 ENCOUNTER — NURSE ONLY (OUTPATIENT)
Dept: SURGERY | Age: 55
End: 2020-08-11

## 2020-08-11 NOTE — PROGRESS NOTES
[facility]    PROCEDURE: RIGHT SIMPLE PROPHYLACTIC MASTECTOMY   1) Exchange for permanent implant left breast                          2) Left breast extensive capsulorrhaphy                          3) Immediate left direct to implant breast reconstruction with Autoderm flap  DATE: 8/4/20    Janeen Qiu has been well since her procedure with pain medications. GEN: NAD   BREAST:  No hematoma/No seroma. IMP: 48 y. o.female s/p R masectomy, L exchange for permament implant  PLAN: Doing well. Incisions healing. Patient to return in 1 week to ensure skin healing. The patient was counseled at length about the risks of taran Covid-19 during their perioperative period and any recovery window from their procedure. The patient was made aware that taran Covid-19  may worsen their prognosis for recovering from their procedure  and lend to a higher morbidity and/or mortality risk. All material risks, benefits, and reasonable alternatives including postponing the procedure were discussed. The patient does wish to proceed with the procedure at this time. Cheri Valente RN  [address] [place] [address] [address] Reconstructive Surgery  [phone]  08/11/20    Pursuant to the emergency declaration under the [facility], [address] [place] [employer] authority and the [employer] Act, this Virtual Visit was conducted, with patient's consent, to reduce the patient's risk of exposure to COVID-19 and provide continuity of care for an established patient. Services were provided through a video synchronous discussion virtually to substitute for in-person clinic visit.

## 2020-08-19 ENCOUNTER — TELEPHONE (OUTPATIENT)
Dept: SURGERY | Age: 55
End: 2020-08-19

## 2020-08-19 ENCOUNTER — OFFICE VISIT (OUTPATIENT)
Dept: SURGERY | Age: 55
End: 2020-08-19

## 2020-08-19 VITALS
HEIGHT: 68 IN | SYSTOLIC BLOOD PRESSURE: 112 MMHG | OXYGEN SATURATION: 100 % | TEMPERATURE: 97.5 F | DIASTOLIC BLOOD PRESSURE: 73 MMHG | BODY MASS INDEX: 27.89 KG/M2 | WEIGHT: 184 LBS | HEART RATE: 60 BPM

## 2020-08-19 PROCEDURE — 99024 POSTOP FOLLOW-UP VISIT: CPT | Performed by: SURGERY

## 2020-08-19 NOTE — PROGRESS NOTES
[facility]    PROCEDURE: 1) Exchange for permanent implant left breast                          2) Left breast extensive capsulorrhaphy                          3) Immediate left direct to implant breast reconstruction with Autoderm flap  DATE: 8/4/20    Adán Gregg has been recovering well since her procedure. Pain has been well controlled without pain medications. EXAM    /73 (Site: Right Upper Arm, Position: Sitting, Cuff Size: Small Adult)   Pulse 60   Temp 97.5 °F (36.4 °C) (Temporal)   Ht 5' 8\" (1.727 m)   Wt 184 lb (83.5 kg)   LMP 06/13/2017   SpO2 100%   BMI 27.98 kg/m²     GEN: NAD   BREAST: Incisions healing well  No heamtoma/seroma  Drain serosang    IMP: 48 y. o.female s/p B IBR  PLAN: Doing well. R drain removed. Will follow-up in 1 month to ensure continued wound healing success.     Cathy Robison MD  [address] [place] [address] [address] Reconstructive Surgery  [phone]  08/19/20

## 2020-08-19 NOTE — TELEPHONE ENCOUNTER
Patient is requesting a return to work letter faxed to [employer] [phone]. Please advise. Thank you!

## 2020-08-27 ENCOUNTER — HOSPITAL ENCOUNTER (OUTPATIENT)
Dept: WOMENS IMAGING | Age: 55
Discharge: HOME | End: 2020-08-27
Payer: COMMERCIAL

## 2020-08-27 PROCEDURE — 77080 DXA BONE DENSITY AXIAL: CPT

## 2020-08-28 ENCOUNTER — TELEPHONE (OUTPATIENT)
Dept: SURGERY | Age: 55
End: 2020-08-28

## 2020-09-11 ENCOUNTER — TELEPHONE (OUTPATIENT)
Dept: SURGERY | Age: 55
End: 2020-09-11

## 2020-09-11 NOTE — TELEPHONE ENCOUNTER
The patient had surgery with Dr. Zeus Mobley 8-4-2020. She called today to discuss her right implant. She said that it doesn't feel right. It is smaller then the left and when she lays down it goes off to the side.  She also stated that she has redness along the bottom scar of her right breast. Please call after 11 am

## 2020-09-23 ENCOUNTER — OFFICE VISIT (OUTPATIENT)
Dept: SURGERY | Age: 55
End: 2020-09-23

## 2020-09-23 VITALS
SYSTOLIC BLOOD PRESSURE: 110 MMHG | DIASTOLIC BLOOD PRESSURE: 64 MMHG | HEART RATE: 68 BPM | HEIGHT: 68 IN | WEIGHT: 180.6 LBS | TEMPERATURE: 97.5 F | OXYGEN SATURATION: 98 % | BODY MASS INDEX: 27.37 KG/M2

## 2020-09-23 PROCEDURE — 99024 POSTOP FOLLOW-UP VISIT: CPT | Performed by: SURGERY

## 2020-09-23 NOTE — PROGRESS NOTES
[facility]    PROCEDURE: 1) Exchange for permanent implant left breast                          2) Left breast extensive capsulorrhaphy                          3) Immediate left direct to implant breast reconstruction with Autoderm flap  DATE: 8/4/20    Faby Molina has been recovering well since her procedure. Pain has been well controlled without pain medications. She notes that there is some deficiency on the medial aspects of the breast when she lays down. EXAM    /64 (Site: Right Upper Arm, Position: Sitting, Cuff Size: Small Adult)   Pulse 68   Temp 97.5 °F (36.4 °C) (Temporal)   Ht 5' 8\" (1.727 m)   Wt 180 lb 9.6 oz (81.9 kg)   LMP 06/13/2017   SpO2 98%   BMI 27.46 kg/m²     GEN: NAD   BREAST: Incisions healing well  No heamtoma/seroma Good contour    IMP: 48 y. o.female s/p B IBR  PLAN: Would benefit from fat grafting to the medial aspects, however, will need to wait a few months for full demarcation / decrease of edema prior to planning fat grafting. Would at the same time preform abdominoplasty per patient request.  Will see again in 3 months for potential nipple tattooing and plan for fat grafting/tummy carleenck in 2021 (after the school year).     William Brody MD  [address] [place] [address] [address] Reconstructive Surgery  [phone]  09/23/20

## 2020-12-10 ENCOUNTER — HOSPITAL ENCOUNTER (OUTPATIENT)
Dept: NUCLEAR MEDICINE | Age: 55
Discharge: HOME | End: 2020-12-10
Payer: COMMERCIAL

## 2020-12-10 PROCEDURE — 78306 BONE IMAGING WHOLE BODY: CPT

## 2020-12-10 PROCEDURE — 3430000000 HC RX DIAGNOSTIC RADIOPHARMACEUTICAL: Performed by: NURSE PRACTITIONER

## 2020-12-10 PROCEDURE — A9503 TC99M MEDRONATE: HCPCS | Performed by: NURSE PRACTITIONER

## 2020-12-10 RX ORDER — TC 99M MEDRONATE 20 MG/10ML
25 INJECTION, POWDER, LYOPHILIZED, FOR SOLUTION INTRAVENOUS
Status: COMPLETED | OUTPATIENT
Start: 2020-12-10 | End: 2020-12-10

## 2020-12-10 RX ADMIN — TC 99M MEDRONATE 25 MILLICURIE: 20 INJECTION, POWDER, LYOPHILIZED, FOR SOLUTION INTRAVENOUS at 08:58

## 2021-01-29 ENCOUNTER — TELEPHONE (OUTPATIENT)
Dept: SURGERY | Age: 56
End: 2021-01-29

## 2021-01-29 NOTE — TELEPHONE ENCOUNTER
I returned the call mentioned below. She is now scheduled to see Dr. Daylin Porter 3- 10:30 am.    I will close this phone note.

## 2021-02-11 ENCOUNTER — HOSPITAL ENCOUNTER (OUTPATIENT)
Age: 56
Discharge: HOME | End: 2021-02-11
Payer: COMMERCIAL

## 2021-02-11 PROCEDURE — 97802 MEDICAL NUTRITION INDIV IN: CPT

## 2021-02-11 NOTE — PROGRESS NOTES
Findings           Bowel Pattern: constipation  Skin:   no issues noted  Chewing / Swallowing:  Sore gums and teeth, has to avoid some super crunchy foods    Food/Nutrition-Related History  Home Diet: regular  Home Supplements / Herbals: multivitamin, hair and nail supplement  Food Restrictions / Cultural Requests:   none  Food Allergies: none    Smoker: none  Consumes Alcohol: occasional    Physical Activity  Activity level has decreased     24 Hour Diet Recall  Coffee with sugar free creamer  [place] bar or pastry     Lunch  Vegetable soup  Ice cream     Dinner  Soup or chili  Tacos  Chicken  Loves eating more vegetables than meat  Loves avocados     Drinks coffee and iced tea    NUTRITION DIAGNOSIS and GOAL  P: Overweight / obesity  E related to lack of energy, decrease in physical activity, increase in sugar intake, and change in medications  S: as evidenced by BMI 27.37    Nutrition Intervention:  - Nutrition Education: 1500 calorie sample menus, weight loss tips,  Education Methods used: verbal and handouts  - Nutrition Counseling: behavorial modifications, how physical activity and increasing water would improve so many factors in her life  - Food/Nutrient delivery: recipes from the "[employer] (written by dietitian)  - Care Management: herbal supplements info from pharmacy    Individualized Treament Goals:  1. Increase water to 64 ounces, decrease caffeine containing to 2 servings per day  2. 1500 calories per day, protein containing food at each meal  3. Use GiveNextometer bruno to track foods, liquids, and activity    Patient encouraged to call RD with any questions. RD available for follow up as needed.      Radha Portillo RD, LD  Contact Number: Office: [phone]; [facility]: [phone]

## 2021-04-12 ENCOUNTER — OFFICE VISIT (OUTPATIENT)
Dept: SURGERY | Age: 56
End: 2021-04-12
Payer: COMMERCIAL

## 2021-04-12 ENCOUNTER — TELEPHONE (OUTPATIENT)
Dept: SURGERY | Age: 56
End: 2021-04-12

## 2021-04-12 VITALS
HEART RATE: 65 BPM | OXYGEN SATURATION: 97 % | BODY MASS INDEX: 28.79 KG/M2 | SYSTOLIC BLOOD PRESSURE: 111 MMHG | DIASTOLIC BLOOD PRESSURE: 76 MMHG | TEMPERATURE: 98.7 F | RESPIRATION RATE: 16 BRPM | WEIGHT: 190 LBS | HEIGHT: 68 IN

## 2021-04-12 DIAGNOSIS — Z09 POSTOP CHECK: Primary | ICD-10-CM

## 2021-04-12 PROCEDURE — 99214 OFFICE O/P EST MOD 30 MIN: CPT | Performed by: SURGERY

## 2021-04-12 PROCEDURE — G8419 CALC BMI OUT NRM PARAM NOF/U: HCPCS | Performed by: SURGERY

## 2021-04-12 PROCEDURE — 1036F TOBACCO NON-USER: CPT | Performed by: SURGERY

## 2021-04-12 PROCEDURE — G8427 DOCREV CUR MEDS BY ELIG CLIN: HCPCS | Performed by: SURGERY

## 2021-04-12 PROCEDURE — 3017F COLORECTAL CA SCREEN DOC REV: CPT | Performed by: SURGERY

## 2021-04-12 NOTE — PROGRESS NOTES
[facility]    PROCEDURE: 1) Exchange for permanent implant left breast                          2) Left breast extensive capsulorrhaphy                          3) Immediate left direct to implant breast reconstruction with Autoderm flap  DATE: 20    Loni Wolf has been recovering well since her procedure. Pain has been well controlled without pain medications. Since her last evaluation, she was  and changed her name. She presents back for discussion regarding fat grafting as well as abdominoplasty. She notes that she has gained weight due to her Tamoxifen, but is now weight stable.     PMHx:   Past Medical History:   Diagnosis Date    Acid reflux     history of no longer a problem    Anemia     Anemia     Blood transfusion     1985    History of blood transfusion     Malignant neoplasm of overlapping sites of left female breast ([address]     Menorrhagia     Obesity     Shingles 2016    Sleep apnea     LOST WT NO LONGER AN ISSUE     PSHx:   Past Surgical History:   Procedure Laterality Date    BREAST ENHANCEMENT SURGERY Left 3/31/2020    LEFT BREAST RECONSTRUCTION WITH STAGE 1 TISSUE EXPANDER PLACEMENT WITH AUTODERM performed by Mick Noriega MD at [facility] Right 2020    LEFT EXCHANGE FOR PERMANENT IMPLANT performed by Mick Noriega MD at [address] N/A 2020    RIGHT DIRECT TO IMPLANT RECONSTRUCTION WITH AUTODERM performed by Mick Noriega MD at [address] Right 2020    RIGHT SIMPLE PROPHYLACTIC MASTECTOMY     SECTION      CHOLECYSTECTOMY  2016    laparoscopic    COLONOSCOPY      HYSTERECTOMY  2017    Robotic assisted total laparoscopic hysterectomy with bilateral salpingectomy    MASTECTOMY Left 3/31/2020    LEFT NIPPLE SPARING MASTECTOMY, SENTINEL NODE BIOPSY/ performed by Nancy Castillo MD at [facility] Right 2020    RIGHT SIMPLE PROPHYLACTIC MASTECTOMY performed by [facility]illo MD at [facility]  5/26/2011    DILATATION AND CURETTAGE, HYSTEROSCOPY NOVASURE ENDOMETRIAL    SLEEVE GASTRECTOMY      TUBAL LIGATION       Allergy:   Allergies   Allergen Reactions    Cephalexin Rash     Patient denies allergy to keflex. States \"I don't really think I have an allergy to it. \"        SHx:   Social History     Socioeconomic History    Marital status:      Spouse name: Not on file    Number of children: 3    Years of education: Not on file    Highest education level: Not on file   Occupational History    Not on file   Social Needs    Financial resource strain: Not on file    Food insecurity     Worry: Not on file     Inability: Not on file   [employer] needs     Medical: Not on file     Non-medical: Not on file   Tobacco Use    Smoking status: Never Smoker    Smokeless tobacco: Never Used   Substance and Sexual Activity    Alcohol use: Yes     Comment: 2-3 drinks weekly    Drug use: No    Sexual activity: Yes     Partners: Male   Lifestyle    Physical activity     Days per week: Not on file     Minutes per session: Not on file    Stress: Not on file   Relationships    Social connections     Talks on phone: Not on file     Gets together: Not on file     Attends [employer] service: Not on file     Active member of club or organization: Not on file     Attends meetings of clubs or organizations: Not on file     Relationship status: Not on file    Intimate partner violence     Fear of current or ex partner: Not on file     Emotionally abused: Not on file     Physically abused: Not on file     Forced sexual activity: Not on file   Other Topics Concern    Not on file   Social History Narrative    ** Merged History Encounter **          FHx: Grandmother with breast CA    Meds:   Current Outpatient Medications   Medication Sig Dispense Refill    TAMOXIFEN CITRATE PO Take 20 mg by mouth nightly  traZODone (DESYREL) 50 MG tablet Take 50 mg by mouth nightly      ibuprofen (ADVIL;MOTRIN) 600 MG tablet Take 1 tablet by mouth every 6 hours as needed for Pain 30 tablet 1    ondansetron (ZOFRAN) 4 MG tablet Take 1 tablet by mouth every 8 hours as needed for Nausea or Vomiting 15 tablet 0    Multiple Vitamins-Minerals (THERAPEUTIC MULTIVITAMIN-MINERALS) tablet Take 1 tablet by mouth daily. No current facility-administered medications for this visit. ROS   Constitutional: Negative for chills and fever. HENT: Negative for congestion, facial swelling, and voice change. Eyes: Negative for photophobia and visual disturbance. Respiratory: Negative for apnea, cough, chest tightness and shortness of breath. Cardiovascular: Negative for chest pain and palpitations. Gastrointestinal: Negative for dysphagia and early satiety. Genitourinary: Negative for difficulty urinating, dysuria, flank pain, frequency and hematuria. Musculoskeletal: Negative for new gait problem, joint swelling and myalgias. Skin: Negative for color change, pallor and rash. Endocrine: negative for tremors, temperature intolerance or polydipsia. Allergic/Immunologic: Negative for new environmental or food allergies. Neurological: Negative for dizziness, seizures, speech difficulty, numbness. Hematological: Negative for adenopathy. Psychiatric/Behavioral: Negative for agitation and confusion.     EXAM    /76 (Site: Left Upper Arm, Position: Sitting, Cuff Size: Medium Adult)   Pulse 65   Temp 98.7 °F (37.1 °C) (Temporal)   Resp 16   Ht 5' 8\" (1.727 m)   Wt 190 lb (86.2 kg)   LMP 06/13/2017   SpO2 97%   BMI 28.89 kg/m²     GEN: NAD, pleasant, obese  CVS: RRR  PULM: No respiratory distress  HEENT: PERRLA/EOMI; hearing appears within normal limits  NECK: Supple with trachea in midline, no masses  FACE:Wearing mask  EXT: No lymphedema  BREAST: Incisions well healed  Good contour, though some deficiency

## 2021-04-16 NOTE — TELEPHONE ENCOUNTER
I spoke with Hannah Patel at [facility] ([phone]) to see if CPT Code 67 818 65 32 or 80790 require pre certification. The codes do not require pre certification and pre determination is optional, but likely not needed with diagnosis code. Call Reference # [id number]    I lmom for patient at the home number listed. I requested a call back to discuss insurance and scheduling surgery. Is Ileana her primary insurance? I will need to provide cosmetic pricing for an abdominoplasty $6970.00    I will leave this phone note open.

## 2021-04-20 NOTE — TELEPHONE ENCOUNTER
I spoke with the patient at the home number listed. The patient is now scheduled for surgery with  on 7-9-2021. The patient stated that [employer] is her primary insurance. The patient is aware of H&P. The patient is scheduled for COVID testing 7-6-2021. The patient is scheduled for her post op appointment 7-. I will submit the surgery letter today. The patient is aware of the cosmetic pricing. She is aware that the payment is due 14 days prior to surgery. I will provide all of the payment information in her surgery information packet. I will mail the surgery information and instructions to the patient today. I will close this phone note.

## 2021-06-29 ENCOUNTER — TELEPHONE (OUTPATIENT)
Dept: SURGERY | Age: 56
End: 2021-06-29

## 2021-06-29 NOTE — TELEPHONE ENCOUNTER
I received a call from the patient asked to cancel surgery because she is not able to pay the cosmetic portion of surgery. I asked the patient if she would like to move forward with the bilateral breast reconstruction with fat grafting and she said that at this time she would like to cancel. She wants to have both surgeries at the same time. The patient will call me back when he is ready to reschedule surgery. I will submit the canceled surgery letter. I will close this phone note.

## 2022-01-03 ENCOUNTER — OFFICE VISIT (OUTPATIENT)
Dept: SURGERY | Age: 57
End: 2022-01-03
Payer: COMMERCIAL

## 2022-01-03 VITALS
HEIGHT: 68 IN | BODY MASS INDEX: 28.64 KG/M2 | HEART RATE: 62 BPM | WEIGHT: 189 LBS | SYSTOLIC BLOOD PRESSURE: 110 MMHG | DIASTOLIC BLOOD PRESSURE: 67 MMHG | TEMPERATURE: 98.2 F

## 2022-01-03 DIAGNOSIS — Z09 POSTOP CHECK: Primary | ICD-10-CM

## 2022-01-03 DIAGNOSIS — Z41.1 ELECTIVE PROCEDURE FOR UNACCEPTABLE COSMETIC APPEARANCE: ICD-10-CM

## 2022-01-03 PROCEDURE — 1036F TOBACCO NON-USER: CPT | Performed by: SURGERY

## 2022-01-03 PROCEDURE — 3017F COLORECTAL CA SCREEN DOC REV: CPT | Performed by: SURGERY

## 2022-01-03 PROCEDURE — G8484 FLU IMMUNIZE NO ADMIN: HCPCS | Performed by: SURGERY

## 2022-01-03 PROCEDURE — G8419 CALC BMI OUT NRM PARAM NOF/U: HCPCS | Performed by: SURGERY

## 2022-01-03 PROCEDURE — 99214 OFFICE O/P EST MOD 30 MIN: CPT | Performed by: SURGERY

## 2022-01-03 PROCEDURE — G8427 DOCREV CUR MEDS BY ELIG CLIN: HCPCS | Performed by: SURGERY

## 2022-01-03 RX ORDER — ANASTROZOLE 1 MG/1
TABLET ORAL
COMMUNITY
Start: 2021-12-03

## 2022-01-03 NOTE — PROGRESS NOTES
[facility]    PROCEDURE: 1) Exchange for permanent implant left breast                          2) Left breast extensive capsulorrhaphy                          3) Immediate left direct to implant breast reconstruction with Autoderm flap  DATE: 20    Yenny Morataya has been recovering well since her procedure. Pain has been well controlled without pain medications. Since her last evaluation, she was  and changed her name. She presents back for discussion regarding fat grafting as well as abdominoplasty. She notes that she has gained weight due to her Tamoxifen. Since her last evaluation, she notes that she has worked to improve her habitus and is still concerned regarding her contour changes in her breast.  Additionally, she is interested in formal discussion regarding abdominoplasty.     PMHx:   Past Medical History:   Diagnosis Date    Acid reflux     history of no longer a problem    Anemia     Anemia     Blood transfusion     1985    History of blood transfusion     Malignant neoplasm of overlapping sites of left female breast ([address]     Menorrhagia     Obesity     Shingles 2016    Sleep apnea     LOST WT NO LONGER AN ISSUE     PSHx:   Past Surgical History:   Procedure Laterality Date    BREAST ENHANCEMENT SURGERY Left 3/31/2020    LEFT BREAST RECONSTRUCTION WITH STAGE 1 TISSUE EXPANDER PLACEMENT WITH AUTODERM performed by Juliana Waters MD at [address] Right 2020    LEFT EXCHANGE FOR PERMANENT IMPLANT performed by Juliana Waters MD at [address] N/A 2020    RIGHT DIRECT TO IMPLANT RECONSTRUCTION WITH AUTODERM performed by Juliana Waters MD at [address] Right 2020    RIGHT SIMPLE PROPHYLACTIC MASTECTOMY     SECTION      CHOLECYSTECTOMY  2016    laparoscopic    COLONOSCOPY      HYSTERECTOMY  2017    Robotic assisted total laparoscopic hysterectomy with bilateral salpingectomy    MASTECTOMY Left 3/31/2020    LEFT NIPPLE SPARING MASTECTOMY, SENTINEL NODE BIOPSY/ performed by Jamila Petty MD at [facility] Right 8/4/2020    RIGHT SIMPLE PROPHYLACTIC MASTECTOMY performed by Jamila Petty MD at [place] [address]  5/26/2011    DILATATION AND CURETTAGE, HYSTEROSCOPY NOVASURE ENDOMETRIAL    SLEEVE GASTRECTOMY      TUBAL LIGATION       Allergy:   Allergies   Allergen Reactions    Cephalexin Rash     Patient denies allergy to keflex. States \"I don't really think I have an allergy to it. \"        SHx:   Social History     Socioeconomic History    Marital status:      Spouse name: Not on file    Number of children: 3    Years of education: Not on file    Highest education level: Not on file   Occupational History    Not on file   Tobacco Use    Smoking status: Never Smoker    Smokeless tobacco: Never Used   Vaping Use    Vaping Use: Never used   Substance and Sexual Activity    Alcohol use: Yes     Comment: 2-3 drinks weekly    Drug use: No    Sexual activity: Yes     Partners: Male   Other Topics Concern    Not on file   Social History Narrative    ** Merged History Encounter **          Social Determinants of Health     Financial Resource Strain:     Difficulty of Paying Living Expenses: Not on file   Food Insecurity:     Worried About Running Out of Food in the Last Year: Not on file    Clemencia of Food in the Last Year: Not on file   Transportation Needs:     Lack of Transportation (Medical): Not on file    Lack of Transportation (Non-Medical):  Not on file   Physical Activity:     Days of Exercise per Week: Not on file    Minutes of Exercise per Session: Not on file   Stress:     Feeling of Stress : Not on file   Social Connections:     Frequency of Communication with Friends and Family: Not on file    Frequency of Social Gatherings with Friends and Family: Not on file    Attends [facility]: Negative for agitation and confusion. EXAM    /67   Pulse 62   Temp 98.2 °F (36.8 °C)   Ht 5' 8\" (1.727 m)   Wt 189 lb (85.7 kg)   LMP 06/13/2017   BMI 28.74 kg/m²     GEN: NAD, pleasant, obese  CVS: RRR  PULM: No respiratory distress  HEENT: PERRLA/EOMI; hearing appears within normal limits  NECK: Supple with trachea in midline, no masses  FACE:Wearing mask  EXT: No lymphedema  BREAST: Incisions well healed  Good contour, though some deficiency superiorly (R>L)  Nipple tattoos in position  Moderate animation deformity  ABD: soft/NT/obese  No palpable hernia  NEURO: No focal deficits, no obvious CN deficits    IMP: 49 y. o.female s/p IBR  PLAN: Would benefit from fat grafting to the bilateral breasts (R>L) as well as abdominoplasty for improved abdominal contour. Cosmetic pricing provided to the patient. Will schedule. R/B/A/O/P discussed in detail with the patient and she agrees to proceed.     Seema Grider MD  [address] [place] [address] [address] Reconstructive Surgery  [phone]  01/03/22

## 2022-01-04 ENCOUNTER — TELEPHONE (OUTPATIENT)
Dept: SURGERY | Age: 57
End: 2022-01-04

## 2022-01-04 NOTE — TELEPHONE ENCOUNTER
The patient was in the office to see Dr. Tiffany Alcantar yesterday. PLAN: Would benefit from fat grafting to the bilateral breasts (R>L) as well as abdominoplasty for improved abdominal contour. Cosmetic pricing provided to the patient. Will schedule. I received a surgery letter from Dr. Tiffany Alcantar. I spoke with at [facility] ([phone]) to see if CPT Code 39625 or 56755 require pre certification. The codes do not require pre certification, but pre determination is recommended, which I started during the call. I will fax clinicals to [phone]. I will scan the information that I fax and the fax success into Epic under the media tab. Pending Case # [id number]    The patient has been provided an insurance update. I will need to provide cosmetic pricing for [facility].     I will leave this phone note open.

## 2022-01-17 NOTE — TELEPHONE ENCOUNTER
I received a fax from [phone] dated 1-. I will scan the fax into Epic under the media tab. APPROVED  Reference Number [id number]  CPT Codes 65011, 79807  Date Range 1-4-2022 to 1-3-2023    I spoke with the patient at the home number listed. The patient is now scheduled for surgery with  on 2-. The patient is aware of H&P. The patient is aware that she will need to be  COVID tested 2-. She is aware that we will not accept a RAPID COVID test and that the result will need to be faxed to us. The patient is scheduled for her post op appointment 3-7-2022. The patient is aware of the cosmetic pricing and procedures and would like to move forward with the abdominoplasty. I will scan the pricing into Epic under the media tab. I will submit the surgery letter today. I will mail the surgery information, instructions and the cosmetic pricing to the patient today. I will close this phone note.

## 2022-02-08 ENCOUNTER — TELEPHONE (OUTPATIENT)
Dept: SURGERY | Age: 57
End: 2022-02-08

## 2022-02-08 NOTE — TELEPHONE ENCOUNTER
I returned the patients call mentioned below. She was provided with the cosmetic information that and payment protocol. I will close this phone note.

## 2022-02-08 NOTE — TELEPHONE ENCOUNTER
Pt called wanting to pay on he surgery. She is asking who to make check out to and where to take it. Please call her.

## 2022-02-15 NOTE — PROGRESS NOTES
Place patient label inside box (if no patient label, complete below)  Name:  :  MR#:   Chevy Del / PROCEDURE  1. I (we), Oliver Suresh (Patient Name) authorize Juan Yusuf MD (Provider / Pamella Galvin) and/or such assistants as may be selected by him/her, to perform the following operation/procedure(s): BILATERAL BREAST RECONSTRUCTION WITH FAT GRAFTING, ABDOMINOPLASTY WITH RECTUS PLICATION AND UMBILICAL TRANSPOSITION       Note: If unable to obtain consent prior to an emergent procedure, document the emergent reason in the medical record. This procedure has been explained to my (our) satisfaction and included in the explanation was:  A) The intended benefit, nature, and extent of the procedure to be performed;  B) The significant risks involved and the probability of success;  C) Alternative procedures and methods of treatment;  D) The dangers and probable consequences of such alternatives (including no procedure or treatment); E) The expected consequences of the procedure on my future health;  F) Whether other qualified individuals would be performing important surgical tasks and/or whether  would be present to advise or support the procedure. I (we) understand that there are other risks of infection and other serious complications in the pre-operative/procedural and postoperative/procedural stages of my (our) care. I (we) have asked all of the questions which I (we) thought were important in deciding whether or not to undergo treatment or diagnosis. These questions have been answered to my (our) satisfaction. I (we) understand that no assurance can be given that the procedure will be a success, and no guarantee or warranty of success has been given to me (us).     2. It has been explained to me (us) that during the course of the operation/procedure, unforeseen conditions may be revealed that necessitate extension of the original procedure(s) or different procedure(s) than those set forth in Paragraph 1. I (we) authorize and request that the above-named physician, his/her assistants or his/her designees, perform procedures as necessary and desirable if deemed to be in my (our) best interest.     Revised 8/2/2021                                                                          Page 1 of 2       3. I acknowledge that health care personnel may be observing this procedure for the purpose of medical education or other specified purposes as may be necessary as requested and/or approved by my (our) physician. 4. I (we) consent to the disposal by the hospital Pathologist of the removed tissue, parts or organs in accordance with hospital policy. 5. I do ____ do not ____ consent to the use of a local infiltration pain blocking agent that will be used by my provider/surgical provider to help alleviate pain during my procedure. 6. I do ____ do not ____ consent to an emergent blood transfusion in the case of a life-threatening situation that requires blood components to be administered. This consent is valid for 24 hours from the beginning of the procedure. 7. This patient does ____ or does not ____ currently have a DNR status/order. If DNR order is in place, obtain Addendum to the Surgical Consent for ALL Patients with a DNR Order to address raissa-operative status for limited intervention or DNR suspension.      8. I have read and fully understand the above Consent for Operation/Procedure and that all blanks were completed before I signed the consent.   _____________________________       _____________________      ____/____am/pm  Signature of Patient or legal representative      Printed Name / Relationship            Date / Time   ____________________________       _____________________      ____/____am/pm  Witness to Signature                                    Printed Name                    Date / Time     If patient is unable to sign or is a minor, complete the following)  Patient is a minor, ____ years of age, or unable to sign because:   ______________________________________________________________________________________________    [facility] If a phone consent is obtained, consent will be documented by using two health care professionals, each affirming that the consenting party has no questions and gives consent for the procedure discussed with the physician/provider.   _____________________          ____________________       _____/_____am/pm   2nd witness to phone consent        Printed name           Date / Time    Informed Consent:  I have provided the explanation described above in section 1 to the patient and/or legal representative.  I have provided the patient and/or legal representative with an opportunity to ask any questions about the proposed operation/procedure.   ___________________________          ____________________         ____/____am/pm  Provider / Proceduralist                            Printed name            Date / Time  Revised 8/2/2021                                                                      Page 2 of 2

## 2022-02-21 NOTE — PROGRESS NOTES
If no one is available at the desk, proceed into the [facility] Waiting Room and go through the door directly into the [facility]. There is a Check-in desk ACROSS from Room 5 (marked with a sign hanging from the ceiling). The phone number for the surgery center is [phone]. 4. Please call [phone] option #2 option #2 if you have not been preregistered yet. On the day of your procedure bring your insurance card and photo ID. You will be registered at your bedside once brought back to your room. 5. DO NOT EAT ANYTHING eight hours prior to your arrival for surgery. May have 8 ounces of water 4 hours prior to your arrival for surgery. NOTE: ALL Gastric, Bariatric and Bowel surgery patients MUST follow their surgeon's instructions. 6. MEDICATIONS    Take the following medications with a SMALL sip of water: Arimidex   Bariatric patient's call surgeon if on diabetic medications as some need to be stopped 1 week preop   Use your usual dose of inhalers the morning of surgery. BRING your rescue inhaler with you to hospital.    Anesthesia does NOT want you to take insulin the morning of surgery. They will control your blood sugar while you are at the hospital. Please contact your ordering physician for instructions regarding your insulin the night before your procedure. If you have an insulin pump, please keep it set on basal rate. 7. Do not swallow water when brushing teeth. No gum, candy, mints or ice chips. Refrain from smoking or at least decrease the amount. 8. Dress in loose, comfortable clothing appropriate for redressing after your procedure. Do not wear jewelry (including body piercings), make-up (especially NO eye make-up), fingernail polish (NO toenail polish if foot/leg surgery), lotion, powders or metal hairclips. 9. Dentures, glasses, or contacts will need to be removed before your procedure.  Bring cases for your glasses, contacts, dentures, or hearing aids to protect them while you are in surgery. 10. If you use a CPAP, please bring it with you on the day of your procedure. 11. We recommend that valuable personal  belongings such as cash, cell phones, e-tablets or jewelry, be left at home during your stay. The hospital will not be responsible for valuables that are not secured in the hospital safe. However, if your insurance requires a co-pay, you may want to bring a method of payment, i.e. Check or credit card, if you wish to pay your co-pay the day of surgery. 12. If you are to stay overnight, you may bring a bag with personal items. Please have any large items you may need brought in by your family after your arrival to your hospital room. 15. If you have a Living Will or Durable Power of , please bring a copy on the day of your procedure. 15. With your permission, one family member may accompany you while you are being prepared for surgery. Once you are ready, additional family members may join you. HOW WE KEEP YOU SAFE and WORK TO PREVENT SURGICAL SITE INFECTIONS:  1. Health care workers should always check your ID bracelet to verify your name and birth date. You will be asked many times to state your name, date of birth, and allergies. 2. Health care workers should always clean their hands with soap or alcohol gel before providing care to you. It is okay to ask anyone if they cleaned their hands before they touch you. 3. You will be actively involved in verifying the type of procedure you are having and ensuring the correct surgical site. This will be confirmed multiple times prior to your procedure. Do NOT melina your surgery site UNLESS instructed to by your surgeon. 4. Do not shave or wax for 72 hours prior to procedure near your operative site. Shaving with a razor can irritate your skin and make it easier to develop an infection.  On the day of your procedure, any hair that needs to be removed near the surgical site will be clipped by a healthcare worker using a special clippers designed to avoid skin irritation. 5. When you are in the operating room, your surgical site will be cleansed with a special soap, and in most cases, you will be given an antibiotic before the surgery begins. What to expect AFTER YOUR PROCEDURE:  1. Immediately following your procedure, your will be taken to the PACU for the first phase of your recovery. Your nurse will help you recover from any potential side effects of anesthesia, such as extreme drowsiness, changes in your vital signs or breathing patterns. Nausea, headache, muscle aches, or sore throat may also occur after anesthesia. Your nurse will help you manage these potential side effects. 2. For comfort and safety, arrange to have someone at home with you for the first 24 hours after discharge. 3. You and your family will be given written instructions about your diet, activity, dressing care, medications, and return visits. 4. Once at home, should issues with nausea, pain, or bleeding occur, or should you notice any signs of infection, you should call your surgeon. 5. Always clean your hands before and after caring for your wound. Do not let your family touch your surgery site without cleaning their hands. 6. Narcotic pain medications can cause significant constipation. You may want to add a stool softener to your postoperative medication schedule or speak to your surgeon on how best to manage this SIDE EFFECT. SPECIAL INSTRUCTIONS  none    Thank you for allowing us to care for you. We strive to exceed your expectations in the delivery of care and service provided to you and your family. If you need to contact the [place] [address] staff for any reason, please call us at [phone]    Instructions reviewed with patient during preadmission testing phone interview.   Shade Jane RN.2/21/2022 .9:28 AM      ADDITIONAL EDUCATIONAL INFORMATION REVIEWED PER PHONE WITH YOU AND/OR YOUR FAMILY:  No Hibiclens® Bathing Instructions   No Antibacterial Soap

## 2022-02-21 NOTE — PROGRESS NOTES
2/21/22 @ 0943 Pt verbalizes understanding of PAT instructions.   I spoke with Buck Arceo at PCP office [phone] and she is faxing all paperwork to us even PCR test.  Annamaria Neves

## 2022-02-23 ENCOUNTER — ANESTHESIA EVENT (OUTPATIENT)
Dept: OPERATING ROOM | Age: 57
DRG: 572 | End: 2022-02-23
Payer: COMMERCIAL

## 2022-02-24 ENCOUNTER — ANESTHESIA (OUTPATIENT)
Dept: OPERATING ROOM | Age: 57
DRG: 572 | End: 2022-02-24
Payer: COMMERCIAL

## 2022-02-24 ENCOUNTER — HOSPITAL ENCOUNTER (INPATIENT)
Age: 57
LOS: 1 days | Discharge: HOME | DRG: 572 | End: 2022-02-25
Attending: SURGERY | Admitting: SURGERY
Payer: COMMERCIAL

## 2022-02-24 VITALS
TEMPERATURE: 96.6 F | RESPIRATION RATE: 5 BRPM | SYSTOLIC BLOOD PRESSURE: 92 MMHG | DIASTOLIC BLOOD PRESSURE: 53 MMHG | OXYGEN SATURATION: 100 %

## 2022-02-24 DIAGNOSIS — Z98.890 S/P ABDOMINOPLASTY: Primary | ICD-10-CM

## 2022-02-24 PROCEDURE — 3700000000 HC ANESTHESIA ATTENDED CARE: Performed by: SURGERY

## 2022-02-24 PROCEDURE — 3600000004 HC SURGERY LEVEL 4 BASE: Performed by: SURGERY

## 2022-02-24 PROCEDURE — 2580000003 HC RX 258: Performed by: ANESTHESIOLOGY

## 2022-02-24 PROCEDURE — 7100000000 HC PACU RECOVERY - FIRST 15 MIN: Performed by: SURGERY

## 2022-02-24 PROCEDURE — 2500000003 HC RX 250 WO HCPCS

## 2022-02-24 PROCEDURE — 15772 GRFG AUTOL FAT LIPO EA ADDL: CPT | Performed by: SURGERY

## 2022-02-24 PROCEDURE — 2500000003 HC RX 250 WO HCPCS: Performed by: NURSE ANESTHETIST, CERTIFIED REGISTERED

## 2022-02-24 PROCEDURE — 1200000000 HC SEMI PRIVATE

## 2022-02-24 PROCEDURE — 3700000001 HC ADD 15 MINUTES (ANESTHESIA): Performed by: SURGERY

## 2022-02-24 PROCEDURE — 2500000003 HC RX 250 WO HCPCS: Performed by: SURGERY

## 2022-02-24 PROCEDURE — 15771 GRFG AUTOL FAT LIPO 50 CC/<: CPT | Performed by: SURGERY

## 2022-02-24 PROCEDURE — 2580000003 HC RX 258

## 2022-02-24 PROCEDURE — 2580000003 HC RX 258: Performed by: SURGERY

## 2022-02-24 PROCEDURE — 2720000010 HC SURG SUPPLY STERILE: Performed by: SURGERY

## 2022-02-24 PROCEDURE — 0JB80ZZ EXCISION OF ABDOMEN SUBCUTANEOUS TISSUE AND FASCIA, OPEN APPROACH: ICD-10-PCS | Performed by: SURGERY

## 2022-02-24 PROCEDURE — 6360000002 HC RX W HCPCS: Performed by: SURGERY

## 2022-02-24 PROCEDURE — 6370000000 HC RX 637 (ALT 250 FOR IP)

## 2022-02-24 PROCEDURE — MISCABD ABDOMINOPLAST-RESECTION, PLCTN, REPSTN, LIPPO: Performed by: SURGERY

## 2022-02-24 PROCEDURE — 3600000014 HC SURGERY LEVEL 4 ADDTL 15MIN: Performed by: SURGERY

## 2022-02-24 PROCEDURE — 0HRV37Z REPLACEMENT OF BILATERAL BREAST WITH AUTOLOGOUS TISSUE SUBSTITUTE, PERCUTANEOUS APPROACH: ICD-10-PCS | Performed by: SURGERY

## 2022-02-24 PROCEDURE — 7100000001 HC PACU RECOVERY - ADDTL 15 MIN: Performed by: SURGERY

## 2022-02-24 PROCEDURE — 0WQF0ZZ REPAIR ABDOMINAL WALL, OPEN APPROACH: ICD-10-PCS | Performed by: SURGERY

## 2022-02-24 PROCEDURE — 6360000002 HC RX W HCPCS: Performed by: NURSE ANESTHETIST, CERTIFIED REGISTERED

## 2022-02-24 PROCEDURE — 2709999900 HC NON-CHARGEABLE SUPPLY: Performed by: SURGERY

## 2022-02-24 PROCEDURE — C1729 CATH, DRAINAGE: HCPCS | Performed by: SURGERY

## 2022-02-24 RX ORDER — GLYCOPYRROLATE 1 MG/5 ML
SYRINGE (ML) INTRAVENOUS PRN
Status: DISCONTINUED | OUTPATIENT
Start: 2022-02-24 | End: 2022-02-24 | Stop reason: SDUPTHER

## 2022-02-24 RX ORDER — DIAZEPAM 2 MG/1
2 TABLET ORAL EVERY 6 HOURS PRN
Status: DISCONTINUED | OUTPATIENT
Start: 2022-02-24 | End: 2022-02-25 | Stop reason: HOSPADM

## 2022-02-24 RX ORDER — ONDANSETRON 2 MG/ML
4 INJECTION INTRAMUSCULAR; INTRAVENOUS EVERY 6 HOURS PRN
Status: DISCONTINUED | OUTPATIENT
Start: 2022-02-24 | End: 2022-02-25 | Stop reason: HOSPADM

## 2022-02-24 RX ORDER — HYDROMORPHONE HCL 110MG/55ML
PATIENT CONTROLLED ANALGESIA SYRINGE INTRAVENOUS PRN
Status: DISCONTINUED | OUTPATIENT
Start: 2022-02-24 | End: 2022-02-24 | Stop reason: SDUPTHER

## 2022-02-24 RX ORDER — HEPARIN SODIUM 5000 [USP'U]/ML
5000 INJECTION, SOLUTION INTRAVENOUS; SUBCUTANEOUS ONCE
Status: COMPLETED | OUTPATIENT
Start: 2022-02-24 | End: 2022-02-24

## 2022-02-24 RX ORDER — SODIUM CHLORIDE 9 MG/ML
25 INJECTION, SOLUTION INTRAVENOUS PRN
Status: DISCONTINUED | OUTPATIENT
Start: 2022-02-24 | End: 2022-02-25 | Stop reason: HOSPADM

## 2022-02-24 RX ORDER — METOCLOPRAMIDE HYDROCHLORIDE 5 MG/ML
INJECTION INTRAMUSCULAR; INTRAVENOUS PRN
Status: DISCONTINUED | OUTPATIENT
Start: 2022-02-24 | End: 2022-02-24 | Stop reason: SDUPTHER

## 2022-02-24 RX ORDER — SODIUM CHLORIDE 0.9 % (FLUSH) 0.9 %
5-40 SYRINGE (ML) INJECTION PRN
Status: DISCONTINUED | OUTPATIENT
Start: 2022-02-24 | End: 2022-02-24 | Stop reason: HOSPADM

## 2022-02-24 RX ORDER — ONDANSETRON 2 MG/ML
INJECTION INTRAMUSCULAR; INTRAVENOUS PRN
Status: DISCONTINUED | OUTPATIENT
Start: 2022-02-24 | End: 2022-02-24 | Stop reason: SDUPTHER

## 2022-02-24 RX ORDER — SODIUM CHLORIDE 9 MG/ML
25 INJECTION, SOLUTION INTRAVENOUS PRN
Status: DISCONTINUED | OUTPATIENT
Start: 2022-02-24 | End: 2022-02-24 | Stop reason: HOSPADM

## 2022-02-24 RX ORDER — MEPERIDINE HYDROCHLORIDE 25 MG/ML
12.5 INJECTION INTRAMUSCULAR; INTRAVENOUS; SUBCUTANEOUS EVERY 5 MIN PRN
Status: DISCONTINUED | OUTPATIENT
Start: 2022-02-24 | End: 2022-02-24 | Stop reason: HOSPADM

## 2022-02-24 RX ORDER — CLINDAMYCIN PHOSPHATE 600 MG/50ML
600 INJECTION INTRAVENOUS ONCE
Status: COMPLETED | OUTPATIENT
Start: 2022-02-24 | End: 2022-02-24

## 2022-02-24 RX ORDER — TRAZODONE HYDROCHLORIDE 50 MG/1
50 TABLET ORAL NIGHTLY
Status: DISCONTINUED | OUTPATIENT
Start: 2022-02-24 | End: 2022-02-25 | Stop reason: HOSPADM

## 2022-02-24 RX ORDER — OXYCODONE HYDROCHLORIDE 5 MG/1
10 TABLET ORAL EVERY 4 HOURS PRN
Status: DISCONTINUED | OUTPATIENT
Start: 2022-02-24 | End: 2022-02-25 | Stop reason: HOSPADM

## 2022-02-24 RX ORDER — DIPHENHYDRAMINE HYDROCHLORIDE 50 MG/ML
12.5 INJECTION INTRAMUSCULAR; INTRAVENOUS
Status: DISCONTINUED | OUTPATIENT
Start: 2022-02-24 | End: 2022-02-24 | Stop reason: HOSPADM

## 2022-02-24 RX ORDER — ROCURONIUM BROMIDE 10 MG/ML
INJECTION, SOLUTION INTRAVENOUS PRN
Status: DISCONTINUED | OUTPATIENT
Start: 2022-02-24 | End: 2022-02-24 | Stop reason: SDUPTHER

## 2022-02-24 RX ORDER — PROPOFOL 10 MG/ML
INJECTION, EMULSION INTRAVENOUS PRN
Status: DISCONTINUED | OUTPATIENT
Start: 2022-02-24 | End: 2022-02-24 | Stop reason: SDUPTHER

## 2022-02-24 RX ORDER — LIDOCAINE HYDROCHLORIDE 20 MG/ML
INJECTION, SOLUTION INTRAVENOUS PRN
Status: DISCONTINUED | OUTPATIENT
Start: 2022-02-24 | End: 2022-02-24 | Stop reason: SDUPTHER

## 2022-02-24 RX ORDER — SODIUM CHLORIDE 0.9 % (FLUSH) 0.9 %
5-40 SYRINGE (ML) INJECTION PRN
Status: DISCONTINUED | OUTPATIENT
Start: 2022-02-24 | End: 2022-02-25 | Stop reason: HOSPADM

## 2022-02-24 RX ORDER — SODIUM CHLORIDE 0.9 % (FLUSH) 0.9 %
5-40 SYRINGE (ML) INJECTION EVERY 12 HOURS SCHEDULED
Status: DISCONTINUED | OUTPATIENT
Start: 2022-02-24 | End: 2022-02-25 | Stop reason: HOSPADM

## 2022-02-24 RX ORDER — ACETAMINOPHEN 500 MG
1000 TABLET ORAL EVERY 6 HOURS
Status: DISCONTINUED | OUTPATIENT
Start: 2022-02-24 | End: 2022-02-25 | Stop reason: HOSPADM

## 2022-02-24 RX ORDER — SODIUM CHLORIDE, SODIUM LACTATE, POTASSIUM CHLORIDE, CALCIUM CHLORIDE 600; 310; 30; 20 MG/100ML; MG/100ML; MG/100ML; MG/100ML
INJECTION, SOLUTION INTRAVENOUS CONTINUOUS
Status: DISCONTINUED | OUTPATIENT
Start: 2022-02-24 | End: 2022-02-24

## 2022-02-24 RX ORDER — SODIUM CHLORIDE 0.9 % (FLUSH) 0.9 %
5-40 SYRINGE (ML) INJECTION EVERY 12 HOURS SCHEDULED
Status: DISCONTINUED | OUTPATIENT
Start: 2022-02-24 | End: 2022-02-24 | Stop reason: HOSPADM

## 2022-02-24 RX ORDER — PROCHLORPERAZINE EDISYLATE 5 MG/ML
5 INJECTION INTRAMUSCULAR; INTRAVENOUS
Status: DISCONTINUED | OUTPATIENT
Start: 2022-02-24 | End: 2022-02-24 | Stop reason: HOSPADM

## 2022-02-24 RX ORDER — OXYCODONE HYDROCHLORIDE 5 MG/1
5 TABLET ORAL EVERY 4 HOURS PRN
Status: DISCONTINUED | OUTPATIENT
Start: 2022-02-24 | End: 2022-02-25 | Stop reason: HOSPADM

## 2022-02-24 RX ORDER — HEPARIN SODIUM 5000 [USP'U]/ML
5000 INJECTION, SOLUTION INTRAVENOUS; SUBCUTANEOUS EVERY 8 HOURS SCHEDULED
Status: DISCONTINUED | OUTPATIENT
Start: 2022-02-25 | End: 2022-02-25 | Stop reason: HOSPADM

## 2022-02-24 RX ORDER — ONDANSETRON 4 MG/1
4 TABLET, ORALLY DISINTEGRATING ORAL EVERY 8 HOURS PRN
Status: DISCONTINUED | OUTPATIENT
Start: 2022-02-24 | End: 2022-02-25 | Stop reason: HOSPADM

## 2022-02-24 RX ORDER — CLINDAMYCIN PHOSPHATE 600 MG/50ML
600 INJECTION INTRAVENOUS EVERY 8 HOURS
Status: DISCONTINUED | OUTPATIENT
Start: 2022-02-24 | End: 2022-02-25 | Stop reason: HOSPADM

## 2022-02-24 RX ORDER — ONDANSETRON 2 MG/ML
4 INJECTION INTRAMUSCULAR; INTRAVENOUS
Status: DISCONTINUED | OUTPATIENT
Start: 2022-02-24 | End: 2022-02-24 | Stop reason: HOSPADM

## 2022-02-24 RX ADMIN — CLINDAMYCIN PHOSPHATE 600 MG: 600 INJECTION, SOLUTION INTRAVENOUS at 09:17

## 2022-02-24 RX ADMIN — HYDROMORPHONE HYDROCHLORIDE 2 MG: 2 INJECTION, SOLUTION INTRAMUSCULAR; INTRAVENOUS; SUBCUTANEOUS at 09:07

## 2022-02-24 RX ADMIN — PROPOFOL 50 MG: 10 INJECTION, EMULSION INTRAVENOUS at 11:26

## 2022-02-24 RX ADMIN — SODIUM CHLORIDE, POTASSIUM CHLORIDE, SODIUM LACTATE AND CALCIUM CHLORIDE: 600; 310; 30; 20 INJECTION, SOLUTION INTRAVENOUS at 10:05

## 2022-02-24 RX ADMIN — METOCLOPRAMIDE 10 MG: 5 INJECTION, SOLUTION INTRAMUSCULAR; INTRAVENOUS at 09:09

## 2022-02-24 RX ADMIN — SODIUM CHLORIDE, POTASSIUM CHLORIDE, SODIUM LACTATE AND CALCIUM CHLORIDE: 600; 310; 30; 20 INJECTION, SOLUTION INTRAVENOUS at 08:10

## 2022-02-24 RX ADMIN — Medication 10 ML: at 20:30

## 2022-02-24 RX ADMIN — PROPOFOL 50 MG: 10 INJECTION, EMULSION INTRAVENOUS at 11:25

## 2022-02-24 RX ADMIN — FAMOTIDINE 20 MG: 10 INJECTION, SOLUTION INTRAVENOUS at 09:06

## 2022-02-24 RX ADMIN — ROCURONIUM BROMIDE 100 MG: 10 INJECTION INTRAVENOUS at 09:08

## 2022-02-24 RX ADMIN — PROPOFOL 150 MG: 10 INJECTION, EMULSION INTRAVENOUS at 09:07

## 2022-02-24 RX ADMIN — ONDANSETRON 8 MG: 2 INJECTION INTRAMUSCULAR; INTRAVENOUS at 09:06

## 2022-02-24 RX ADMIN — PROPOFOL 100 MG: 10 INJECTION, EMULSION INTRAVENOUS at 11:19

## 2022-02-24 RX ADMIN — TRANEXAMIC ACID 1000 MG: 1 INJECTION, SOLUTION INTRAVENOUS at 09:25

## 2022-02-24 RX ADMIN — ACETAMINOPHEN 1000 MG: 500 TABLET ORAL at 20:29

## 2022-02-24 RX ADMIN — TRAZODONE HYDROCHLORIDE 50 MG: 50 TABLET ORAL at 20:29

## 2022-02-24 RX ADMIN — CLINDAMYCIN PHOSPHATE 600 MG: 600 INJECTION, SOLUTION INTRAVENOUS at 18:25

## 2022-02-24 RX ADMIN — ROCURONIUM BROMIDE 50 MG: 10 INJECTION INTRAVENOUS at 10:05

## 2022-02-24 RX ADMIN — ROCURONIUM BROMIDE 50 MG: 10 INJECTION INTRAVENOUS at 10:32

## 2022-02-24 RX ADMIN — HEPARIN SODIUM 5000 UNITS: 5000 INJECTION INTRAVENOUS; SUBCUTANEOUS at 08:05

## 2022-02-24 RX ADMIN — LIDOCAINE HYDROCHLORIDE 100 MG: 20 INJECTION, SOLUTION INTRAVENOUS at 09:07

## 2022-02-24 RX ADMIN — Medication 0.2 MG: at 11:09

## 2022-02-24 RX ADMIN — SUGAMMADEX 200 MG: 100 INJECTION, SOLUTION INTRAVENOUS at 11:39

## 2022-02-24 ASSESSMENT — PULMONARY FUNCTION TESTS
PIF_VALUE: 19
PIF_VALUE: 14
PIF_VALUE: 16
PIF_VALUE: 20
PIF_VALUE: 18
PIF_VALUE: 18
PIF_VALUE: 14
PIF_VALUE: 17
PIF_VALUE: 14
PIF_VALUE: 17
PIF_VALUE: 19
PIF_VALUE: 18
PIF_VALUE: 19
PIF_VALUE: 14
PIF_VALUE: 17
PIF_VALUE: 15
PIF_VALUE: 1
PIF_VALUE: 18
PIF_VALUE: 22
PIF_VALUE: 18
PIF_VALUE: 14
PIF_VALUE: 19
PIF_VALUE: 15
PIF_VALUE: 19
PIF_VALUE: 16
PIF_VALUE: 15
PIF_VALUE: 18
PIF_VALUE: 18
PIF_VALUE: 16
PIF_VALUE: 19
PIF_VALUE: 18
PIF_VALUE: 17
PIF_VALUE: 16
PIF_VALUE: 25
PIF_VALUE: 18
PIF_VALUE: 18
PIF_VALUE: 19
PIF_VALUE: 17
PIF_VALUE: 14
PIF_VALUE: 15
PIF_VALUE: 1
PIF_VALUE: 15
PIF_VALUE: 15
PIF_VALUE: 18
PIF_VALUE: 19
PIF_VALUE: 1
PIF_VALUE: 19
PIF_VALUE: 16
PIF_VALUE: 17
PIF_VALUE: 19
PIF_VALUE: 14
PIF_VALUE: 1
PIF_VALUE: 17
PIF_VALUE: 13
PIF_VALUE: 17
PIF_VALUE: 14
PIF_VALUE: 14
PIF_VALUE: 18
PIF_VALUE: 10
PIF_VALUE: 16
PIF_VALUE: 18
PIF_VALUE: 19
PIF_VALUE: 18
PIF_VALUE: 14
PIF_VALUE: 1
PIF_VALUE: 14
PIF_VALUE: 18
PIF_VALUE: 17
PIF_VALUE: 22
PIF_VALUE: 14
PIF_VALUE: 1
PIF_VALUE: 14
PIF_VALUE: 14
PIF_VALUE: 13
PIF_VALUE: 16
PIF_VALUE: 13
PIF_VALUE: 14
PIF_VALUE: 16
PIF_VALUE: 14
PIF_VALUE: 20
PIF_VALUE: 15
PIF_VALUE: 17
PIF_VALUE: 13
PIF_VALUE: 18
PIF_VALUE: 19
PIF_VALUE: 14
PIF_VALUE: 19
PIF_VALUE: 17
PIF_VALUE: 14
PIF_VALUE: 14
PIF_VALUE: 2
PIF_VALUE: 23
PIF_VALUE: 18
PIF_VALUE: 15
PIF_VALUE: 14
PIF_VALUE: 19
PIF_VALUE: 14
PIF_VALUE: 15
PIF_VALUE: 14
PIF_VALUE: 15
PIF_VALUE: 19
PIF_VALUE: 11
PIF_VALUE: 18
PIF_VALUE: 16
PIF_VALUE: 14
PIF_VALUE: 16
PIF_VALUE: 19
PIF_VALUE: 18
PIF_VALUE: 18
PIF_VALUE: 16
PIF_VALUE: 19
PIF_VALUE: 18
PIF_VALUE: 14
PIF_VALUE: 19
PIF_VALUE: 16
PIF_VALUE: 13
PIF_VALUE: 15
PIF_VALUE: 15
PIF_VALUE: 17
PIF_VALUE: 16
PIF_VALUE: 18
PIF_VALUE: 19
PIF_VALUE: 19
PIF_VALUE: 13
PIF_VALUE: 23
PIF_VALUE: 14
PIF_VALUE: 14
PIF_VALUE: 18
PIF_VALUE: 19
PIF_VALUE: 14
PIF_VALUE: 19
PIF_VALUE: 18
PIF_VALUE: 17
PIF_VALUE: 19
PIF_VALUE: 19
PIF_VALUE: 17
PIF_VALUE: 19
PIF_VALUE: 18
PIF_VALUE: 19
PIF_VALUE: 1
PIF_VALUE: 17
PIF_VALUE: 1
PIF_VALUE: 17
PIF_VALUE: 18
PIF_VALUE: 17
PIF_VALUE: 17
PIF_VALUE: 18
PIF_VALUE: 18
PIF_VALUE: 19
PIF_VALUE: 15
PIF_VALUE: 14
PIF_VALUE: 17
PIF_VALUE: 17
PIF_VALUE: 16
PIF_VALUE: 18
PIF_VALUE: 19
PIF_VALUE: 15

## 2022-02-24 ASSESSMENT — PAIN DESCRIPTION - DESCRIPTORS
DESCRIPTORS: ACHING
DESCRIPTORS: ACHING

## 2022-02-24 ASSESSMENT — PAIN SCALES - GENERAL
PAINLEVEL_OUTOF10: 0
PAINLEVEL_OUTOF10: 4
PAINLEVEL_OUTOF10: 0

## 2022-02-24 ASSESSMENT — PAIN DESCRIPTION - PROGRESSION: CLINICAL_PROGRESSION: GRADUALLY WORSENING

## 2022-02-24 ASSESSMENT — PAIN DESCRIPTION - ONSET: ONSET: ON-GOING

## 2022-02-24 ASSESSMENT — PAIN DESCRIPTION - LOCATION: LOCATION: HEAD

## 2022-02-24 ASSESSMENT — PAIN DESCRIPTION - PAIN TYPE: TYPE: ACUTE PAIN

## 2022-02-24 ASSESSMENT — PAIN DESCRIPTION - ORIENTATION: ORIENTATION: MID

## 2022-02-24 ASSESSMENT — PAIN - FUNCTIONAL ASSESSMENT: PAIN_FUNCTIONAL_ASSESSMENT: 0-10

## 2022-02-24 ASSESSMENT — LIFESTYLE VARIABLES: SMOKING_STATUS: 0

## 2022-02-24 ASSESSMENT — PAIN DESCRIPTION - FREQUENCY: FREQUENCY: CONTINUOUS

## 2022-02-24 NOTE — CARE COORDINATION
CM following, pt from home ind with family support, plans to DC home no needs. POD#0 pending return GI function, pain control, MITCH care.   Electronically signed by Alma Novak RN on 2/24/2022 at 3:16 PM .[phone]

## 2022-02-24 NOTE — PROGRESS NOTES
Dr Xenia Bae offered medication for anxiety which she declined. 5162 Medication offered (as above) because she said she was nervous and she declined. Looks to spouse and he said she can decide.

## 2022-02-24 NOTE — PROGRESS NOTES
Pt arrived to unit around 1415. VSS, Pt remains A&Ox4. New orders acknowledged. Surgical sites clean, dry, intact with MITCH drains in place and abdominal binder intact. Uribe in place. Pt received education on ambulating in beach chair position. Fall precautions in place and call light within reach.  Will continue to monitor

## 2022-02-24 NOTE — PROGRESS NOTES
Patient admitted to PACU post BILATERAL BREAST RECONSTRUCTION WITH FAT GRAFTING, ABDOMINOPLASTY WITH RECTUS PLICATION AND UMBILICAL TRANSPOSITION - Bilateral with Dr. Melvin Mancilla. CRNA and Dr. Bora Pena gave report on arrival to PACU. VSS at this time. Abdominal binder in place. 2 MITCH drains in place. Patient denies pain and nausea at this time. Will continue to monitor.

## 2022-02-24 NOTE — PROGRESS NOTES
4 Eyes Admission Assessment     I agree as the admission nurse that 2 RN's have performed a thorough Head to Toe Skin Assessment on the patient. ALL assessment sites listed below have been assessed on admission. Areas assessed by both nurses:   [x]   Head, Face, and Ears   [x]   Shoulders, Back, and Chest  [x]   Arms, Elbows, and Hands   [x]   Coccyx, Sacrum, and Ischium  [x]   Legs, Feet, and Heels        Does the Patient have Skin Breakdown?   No         Erlin Prevention initiated:  NA   Wound Care Orders initiated:  NA      WOC nurse consulted for Pressure Injury (Stage 3,4, Unstageable, DTI, NWPT, and Complex wounds) or Erlin score 18 or lower:  NA      Nurse 1 eSignature: Electronically signed by Ralph Lara RN on 2/24/22 at 3:04 PM EST    **SHARE this note so that the co-signing nurse is able to place an eSignature**    Nurse 2 eSignature: Electronically signed by Alia Gamboa RN on 2/24/22 at 10:30 PM EST

## 2022-02-24 NOTE — FLOWSHEET NOTE
02/24/22 0851   Encounter Summary   Services provided to: Patient and family together   Referral/Consult From: [facility] Family members   Continue Visiting   (2/24, dottie )   Complexity of Encounter Moderate   Length of Encounter 30 minutes   Routine   Type Pre-procedure   Assessment Calm; Approachable; Hopeful   Intervention Active listening;Explored feelings, thoughts, concerns;Nurtured hope   Outcome Comfort;Expressed gratitude   Staff [place], [place]

## 2022-02-24 NOTE — BRIEF OP NOTE
Brief Postoperative Note      Patient: Luisito Antoine  YOB: 1965  MRN: [id number]    Date of Procedure: 2/24/2022    Pre-Op Diagnosis: BREAST CANCER C5.912, Rectus diastasis [M62.08] S/P breast reconstruction [Z98.890]    Post-Op Diagnosis: Same       Procedure(s):  BILATERAL BREAST RECONSTRUCTION WITH FAT GRAFTING, ABDOMINOPLASTY WITH RECTUS PLICATION AND UMBILICAL TRANSPOSITION  . Surgeon(s):  Lord Porsha MD    Assistant:  Surgical Assistant: Yuki Gregory  Resident: Michaela Orlando DO    Anesthesia: General    Estimated Blood Loss (mL): Minimal    Complications: None    Specimens:   * No specimens in log *    Implants:  * No implants in log *      Drains:   Closed/Suction Drain Lateral;Right Chest Bulb 15 Singaporean (Active)       Closed/Suction Drain Right Abdomen;RLQ Bulb 15 Singaporean (Active)       Closed/Suction Drain Left Abdomen;LLQ Bulb 15 Singaporean (Active)       Findings: bilateral breast fat grafting; abdominoplasty with rectus plication and umbilical transposition; 2x MITCH drains to abdomen.      Electronically signed by Andrzej Mathew DO on 2/24/2022 at 11:59 AM

## 2022-02-24 NOTE — PROGRESS NOTES
PACU Transfer to Floor Note    Current Allergies: Cephalexin    Pt meets criteria as per Hussain Score and ASPAN Standards to transfer to next phase of care. No results for input(s): POCGLU in the last 72 hours. Vitals:    02/24/22 1330   BP: (!) 104/59   Pulse: 74   Resp: 14   Temp: 97.4 °F (36.3 °C)   SpO2: 100%     Vitals within 20% of pt's admission vitals as per HUSSAIN SCORE    SpO2: 100 %    O2 Flow Rate (L/min): 2 L/min      Intake/Output Summary (Last 24 hours) at 2/24/2022 1354  Last data filed at 2/24/2022 1324  Gross per 24 hour   Intake 1792.41 ml   Output 525 ml   Net 1267.41 ml       Pain assessment:  none    Pain Level: 0    Patient was assessed for alterations to skin integrity. There were not alterations observed. Is patient incontinent: no    Handoff report given at bedside.    Family updated and directed to pt room      2/24/2022 1:54 PM

## 2022-02-24 NOTE — ANESTHESIA PRE PROCEDURE
Department of Anesthesiology  Preprocedure Note       Name:  Mario العلي   Age:  49 y.o.  :  1965                                          MRN:  [id number]         Date:  2022      Surgeon: Corinthia Goodpasture):  Tavon Khan MD    Procedure: Procedure(s):  BILATERAL BREAST RECONSTRUCTION WITH FAT GRAFTING, ABDOMINOPLASTY WITH RECTUS PLICATION AND UMBILICAL TRANSPOSITION  . Medications prior to admission:   Prior to Admission medications    Medication Sig Start Date End Date Taking? Authorizing Provider   anastrozole (ARIMIDEX) 1 MG tablet  12/3/21  Yes Historical Provider, MD   traZODone (DESYREL) 50 MG tablet Take 50 mg by mouth nightly   Yes Historical Provider, MD   Multiple Vitamins-Minerals (THERAPEUTIC MULTIVITAMIN-MINERALS) tablet Take 1 tablet by mouth daily. Yes Historical Provider, MD       Current medications:    Current Facility-Administered Medications   Medication Dose Route Frequency Provider Last Rate Last Admin    tranexamic acid (CYKLOKAPRON) 1,000 mg in sodium chloride 0.9 % 50 mL IVPB  1,000 mg IntraVENous Once Tavon Khan MD        clindamycin (CLEOCIN) 600 mg in dextrose 5 % 50 mL IVPB  600 mg IntraVENous Once Tavon Khan MD        lactated ringers infusion   IntraVENous Continuous Renetta Erazo DO        heparin (porcine) injection 5,000 Units  5,000 Units SubCUTAneous Once Tavon Khan MD           Allergies: Allergies   Allergen Reactions    Cephalexin Rash     Patient denies allergy to keflex. States \"I don't really think I have an allergy to it. \"        Problem List:    Patient Active Problem List   Diagnosis Code    Cholelithiases K80.20    Cholecystitis K81.9    Acute cholecystitis K81.0    S/P laparoscopic cholecystectomy Z90.49    Abnormal uterine bleeding (AUB) N93.9    Malignant neoplasm of overlapping sites of left female breast ([facility] [address] I87.673       Past Medical History:        Diagnosis Date    Acid reflux     history of no longer a problem    Anemia     Anemia     Blood transfusion     [address] present     History of blood transfusion     Malignant neoplasm of overlapping sites of left female breast ([address]     Menorrhagia     Obesity     Shingles 2016    Sleep apnea     LOST WT NO LONGER AN ISSUE       Past Surgical History:        Procedure Laterality Date    BREAST ENHANCEMENT SURGERY Left 3/31/2020    LEFT BREAST RECONSTRUCTION WITH STAGE 1 TISSUE EXPANDER PLACEMENT WITH AUTODERM performed by Priscila Mcmanus MD at [address] Right 2020    LEFT EXCHANGE FOR PERMANENT IMPLANT performed by Priscila Mcmanus MD at [address] N/A 2020    RIGHT DIRECT TO IMPLANT RECONSTRUCTION WITH AUTODERM performed by Priscila Mcmanus MD at [address] Right 2020    RIGHT SIMPLE PROPHYLACTIC MASTECTOMY     SECTION      CHOLECYSTECTOMY  2016    laparoscopic    COLONOSCOPY      HYSTERECTOMY  2017    Robotic assisted total laparoscopic hysterectomy with bilateral salpingectomy    MASTECTOMY Left 3/31/2020    LEFT NIPPLE SPARING MASTECTOMY, SENTINEL NODE BIOPSY/ performed by Manuela Garibay MD at [facility] Right 2020    RIGHT SIMPLE PROPHYLACTIC MASTECTOMY performed by Manuela Garibay MD at [address] [place] [address]  2011    DILATATION AND CURETTAGE, HYSTEROSCOPY NOVASURE ENDOMETRIAL    SLEEVE GASTRECTOMY      TUBAL LIGATION         Social History:    Social History     Tobacco Use    Smoking status: Never Smoker    Smokeless tobacco: Never Used   Substance Use Topics    Alcohol use: Yes     Comment: 1-2 drinks nightly/depends on the week                                Counseling given: Not Answered      Vital Signs (Current):   Vitals:    02/15/22 1318 22 0925 22 0717 22 0730   BP:    121/61   Pulse:    63   Resp:    20   TempSrc:    Oral   SpO2:    100%   Weight: 189 lb (85.7 kg) 189 lb (85.7 kg) 189 lb (85.7 kg)    Height: 5' 8\" (1.727 m) 5' 8\" (1.727 m) 5' 8\" (1.727 m)                                               BP Readings from Last 3 Encounters:   02/24/22 121/61   01/03/22 110/67   04/12/21 111/76       NPO Status: Time of last liquid consumption: 0200 (2 oz water)                        Time of last solid consumption: 2000                        Date of last liquid consumption: 02/24/22                        Date of last solid food consumption: 02/23/22    BMI:   Wt Readings from Last 3 Encounters:   02/24/22 189 lb (85.7 kg)   01/03/22 189 lb (85.7 kg)   04/12/21 190 lb (86.2 kg)     Body mass index is 28.74 kg/m². CBC:   Lab Results   Component Value Date    WBC 4.2 08/04/2020    RBC 4.74 08/04/2020    HGB 12.7 08/04/2020    HCT 37.7 08/04/2020    MCV 79.5 08/04/2020    RDW 15.6 08/04/2020     08/04/2020       CMP:   Lab Results   Component Value Date     08/04/2020    K 4.0 08/04/2020     08/04/2020    CO2 27 08/04/2020    BUN 11 08/04/2020    CREATININE 0.7 08/04/2020    GFRAA >60 08/04/2020    GFRAA >60 02/04/2012    AGRATIO 1.4 08/04/2020    LABGLOM >60 08/04/2020    GLUCOSE 94 08/04/2020    PROT 7.2 08/04/2020    PROT 8.0 02/04/2012    CALCIUM 8.8 08/04/2020    BILITOT <0.2 08/04/2020    ALKPHOS 56 08/04/2020    AST 20 08/04/2020    ALT 8 08/04/2020       POC Tests: No results for input(s): POCGLU, POCNA, POCK, POCCL, POCBUN, POCHEMO, POCHCT in the last 72 hours.     Coags:   Lab Results   Component Value Date    PROTIME 10.9 08/04/2020    INR 0.94 08/04/2020    APTT 26.8 08/04/2020       HCG (If Applicable):   Lab Results   Component Value Date    PREGTESTUR Negative 07/03/2017        ABGs: No results found for: PHART, PO2ART, NTM2NSL, NNF8QNV, BEART, E3AABAPW     Type & Screen (If Applicable):  No results found for: LABABO, LABRH    Drug/Infectious Status (If Applicable):  No results found for: HIV, HEPCAB    COVID-19 Screening (If Applicable):   Lab Results   Component Value Date    COVID19 Not Detected 07/30/2020           Anesthesia Evaluation  Patient summary reviewed and Nursing notes reviewed no history of anesthetic complications:   Airway: Mallampati: II  TM distance: >3 FB   Neck ROM: full  Mouth opening: > = 3 FB Dental:    (+) caps  Comment: Multiple caps     Pulmonary: breath sounds clear to auscultation  (+) sleep apnea: on CPAP,      (-) not a current smoker (never)                           Cardiovascular:  Exercise tolerance: good (>4 METS),       (-) past MI    NYHA Classification: II    Rhythm: regular  Rate: normal           Beta Blocker:  Not on Beta Blocker         Neuro/Psych:   Negative Neuro/Psych ROS              GI/Hepatic/Renal:   (+) GERD: well controlled,          ROS comment: Gastric sleeve 2010   100#  . Endo/Other: Negative Endo/Other ROS   (+) malignancy/cancer (2020   left  mastectomy    no chemo/radiation ). Abdominal:             Vascular: negative vascular ROS. Other Findings:             Anesthesia Plan      general     ASA 3       Induction: intravenous. MIPS: Postoperative opioids intended and Prophylactic antiemetics administered. Anesthetic plan and risks discussed with patient and spouse. Plan discussed with CRNA.     Attending anesthesiologist reviewed and agrees with Preprocedure content              Nadine Ferguson DO   2/24/2022

## 2022-02-24 NOTE — OP NOTE
NAME: Jaden Hutchins   MRN: [id number]  DATE: 2/24/2022     AGE: 49 y.o. PREOPERATIVE DIAGNOSIS:  Breast cancer status post implant based reconstruction, significant rectus diastasis. POSTOPERATIVE DIAGNOSIS:  Same. PROCEDURES:  1. Revision bilateral breast reconstruction with fat grafting  2. Abdominoplasty with rectus plication and umbilical transposition     SURGEON:  Merna Girard MD     ASSISTANTS: Claudette Horseman (PGY1), Deneen Rios (SA)     ANESTHESIA:  General (1 hour cosmetic). ESTIMATED BLOOD LOSS:  150  mL. DRAINS:  2 15F round (abdomen)     SPECIMENS: None (removed abdominal weight 5.6 lbs)    OPERATIVE INDICATIONS:  This is a 49 y.o. female with history of breast cancer who underwent implant based reconstruction. She did well, however had some rippling as well as contour deformities in the superior aspects, thus she desired fat grafting for improvement. Additionally, she lost a weight and had a significant rectus diastasis. She desired concomitant abdominoplasty. A thorough discussion regarding the risks, benefits, alternatives, outcomes, personnel involved with the patient and family. After all questions were answered, the patient elected to proceed with the above procedures. OPERATIVE PROCEDURE:  The patient was brought to the operating room and placed in the supine position on the operating room table. She then underwent general endotracheal anesthesia without difficulty and was then prepped and draped in the usual sterile manner. A timeout was performed confirming the patient and the procedure to be performed. The case began by infiltrating tumescent solution into the abdomen. After a satisfactory amount of time for tumescent effect, liposuction was performed using a Revolve system. A total of 120 cc of lipoaspirate was utilized for fat grafting to the bilateral breasts.  After the injection was completed, the breast sites were then closed using 5-0 Fast Gut.    Attention was then directed back to the abdomen. An incision was created along the inferior marking with a 10 blade. The subcutaneous tissue was dissected to the anterior abdominal wall with electrocautery. Hemostasis was obtained with a combination of clips, and electrocautery. Dissection then continued in a cranial direction until the superior markings. There was a severe rectus diastasis noted with bowel peristalsis beneath the thin fascia. The umbilicus was then dissected off the skin circumferentially . The umbilicus was left on its stalk onto the abdominal wall. Dissection in the midline then continued further to the xiphoid. The rectus musculature was plicated in multiple layers using #1 PDS sutures. There was significant improvement in the contour and strength of the abdominal wall once completed. The peak inspiratory pressures also did not increase. The patient was sat up to ensure closure. The entire inferior aspect of the abdomen was then excised. Hemostasis was again ensured using a combination of clips, electrocautery, ties, and Surgicel powder, and VistaSeal over the plicated portion. Two 15F drains were brought out through separate stab incisions and secured in place using 3-0 Nylon sutures. The patient was then sat up and tailor tacked with maty. Vinh's fascia was then closed using 2-0 Vicryl suture followed by 3-0 Stratafix sutures for the deep dermis. A sue-umbilicus was created using an oval incision and the umbilicus was sutured to the skin using 3-0 Monocryl and 5-0 Chromic suture. A sterile dressing was applied and the patient was awakened, extubated, and taken to the PACU in satisfactory condition. There were no immediate complications and the patient tolerated the procedure well. At the end of the case, all counts were correct.       Delilah Heart MD

## 2022-02-24 NOTE — H&P
[facility]    [id number]    [facility] Same Day Surgery Update H & P  Department of General Surgery   Surgical Service   Pre-operative History and Physical  Last H & P within the last 30 days. DIAGNOSIS:   Bilateral malignant neoplasm of breast in female, unspecified estrogen receptor status, unspecified site of breast ([facility] [C50.911, C50.912]  Rectus diastasis [M62.08]  S/P breast reconstruction [Z98.890]    Procedure(s):  BILATERAL BREAST RECONSTRUCTION WITH FAT GRAFTING, ABDOMINOPLASTY WITH RECTUS PLICATION AND UMBILICAL TRANSPOSITION  . History obtained from: Patient interview and EHR      HISTORY OF PRESENT ILLNESS:   Patient is a 50 y/o female who presents with asymmetry in her left breast.  She underwent left breast reconstruction in 2020, due to intraductal carcinoma. Additionally, she wishes to improve her habitus, and presents for the above procedures. Covid 19:  Negative COVID NAAT on 2/18/2022. Patient denies fever, chills, worsening cough, or known exposure to Covid-19.        Past Medical History:        Diagnosis Date    Acid reflux     history of no longer a problem    Anemia     Anemia     Blood transfusion     1985    Dental crowns present     History of blood transfusion     [facility] of [facility]     Menorrhagia     Obesity     Shingles 5/4/2016    Sleep apnea     LOST WT NO LONGER AN ISSUE     Past Surgical History:        Procedure Laterality Date    BREAST ENHANCEMENT SURGERY Left 3/31/2020    LEFT BREAST RECONSTRUCTION WITH STAGE 1 TISSUE EXPANDER PLACEMENT WITH AUTODERM performed by Kiran Brandon MD at [address] Right 8/4/2020    LEFT EXCHANGE FOR PERMANENT IMPLANT performed by Kiran Brandon MD at [address] N/A 8/4/2020    RIGHT DIRECT TO IMPLANT RECONSTRUCTION WITH AUTODERM performed by Kiran Brandon MD at [address] Right 08/04/2020 RIGHT SIMPLE PROPHYLACTIC MASTECTOMY     SECTION      CHOLECYSTECTOMY  2016    laparoscopic    COLONOSCOPY      HYSTERECTOMY  2017    Robotic assisted total laparoscopic hysterectomy with bilateral salpingectomy    MASTECTOMY Left 3/31/2020    LEFT NIPPLE SPARING MASTECTOMY, SENTINEL NODE BIOPSY/ performed by Ruby Zuleta MD at [facility] Right 2020    RIGHT SIMPLE PROPHYLACTIC MASTECTOMY performed by Ruby Zuleta MD at [place] [address]  2011    DILATATION AND CURETTAGE, HYSTEROSCOPY NOVASURE ENDOMETRIAL    SLEEVE GASTRECTOMY      TUBAL LIGATION       Past Social History:  Social History     Socioeconomic History    Marital status:      Spouse name: None    Number of children: 3    Years of education: None    Highest education level: None   Occupational History    None   Tobacco Use    Smoking status: Never Smoker    Smokeless tobacco: Never Used   Vaping Use    Vaping Use: Never used   Substance and Sexual Activity    Alcohol use: Yes     Comment: 1-2 drinks nightly/depends on the week    Drug use: No    Sexual activity: Yes     Partners: Male   Other Topics Concern    None   Social History Narrative    ** Merged History Encounter **          Social Determinants of Health     Financial Resource Strain:     Difficulty of Paying Living Expenses: Not on file   Food Insecurity:     Worried About Running Out of Food in the Last Year: Not on file    Clemencia of Food in the Last Year: Not on file   Transportation Needs:     Lack of Transportation (Medical): Not on file    Lack of Transportation (Non-Medical):  Not on file   Physical Activity:     Days of Exercise per Week: Not on file    Minutes of Exercise per Session: Not on file   Stress:     Feeling of Stress : Not on file   Social Connections:     Frequency of Communication with Friends and Family: Not on file    Frequency of Social Gatherings with Friends and Family: Not on file    Attends Holiness Services: Not on file    Active Member of Clubs or Organizations: Not on file    Attends Club or Organization Meetings: Not on file    Marital Status: Not on file   Intimate Partner Violence:     Fear of Current or Ex-Partner: Not on file    Emotionally Abused: Not on file    Physically Abused: Not on file    Sexually Abused: Not on file   Housing Stability:     Unable to Pay for Housing in the Last Year: Not on file    Number of [place] in the Last Year: Not on file    Unstable Housing in the Last Year: Not on file         Medications Prior to Admission:      Prior to Admission medications    Medication Sig Start Date End Date Taking? Authorizing Provider   anastrozole (ARIMIDEX) 1 MG tablet  12/3/21  Yes Historical Provider, MD   traZODone (DESYREL) 50 MG tablet Take 50 mg by mouth nightly   Yes Historical Provider, MD   Multiple Vitamins-Minerals (THERAPEUTIC MULTIVITAMIN-MINERALS) tablet Take 1 tablet by mouth daily. Yes Historical Provider, MD         Allergies:  Cephalexin    PHYSICAL EXAM:      /61   Pulse 63   Resp 20   Ht 5' 8\" (1.727 m)   Wt 189 lb (85.7 kg)   LMP 06/13/2017   SpO2 100%   BMI 28.74 kg/m²      Airway:  Airway patent with no audible stridor    Heart:  Regular rate and rhythm, No murmur noted    Lungs:  No increased work of breathing, good air exchange, clear to auscultation bilaterally, no crackles or wheezing    Abdomen:  Soft, non-distended, non-tender, no rebound tenderness or guarding, and no masses palpated    ASSESSMENT AND PLAN     Patient is a 49 y.o. female with above specified procedure planned. 1.  The patients history and physical was obtained and signed off by the pre-admission testing department. Patient seen and focused exam done today- no new changes since last physical exam on 2/18/2022.    2.  Access to ancillary services are available per request of the provider.     GRACIELA Cunningham - CNP 2/24/2022

## 2022-02-24 NOTE — PROGRESS NOTES
Department of Surgery  Post Op Note    Objective: Patient resting in bed. Tolerating sips of regular tray without difficulty. States pain is well controlled at present and denies any c/o nausea or emesis. Garcia intact    Anesthesia type: General      I/O    Intra op    Post op     Fluids    1742.4      EBL     100      Urine     455        Exam: VITALS:  /67   Pulse 69   Temp 97.4 °F (36.3 °C) (Temporal)   Resp 16   Ht 5' 8\" (1.727 m)   Wt 189 lb (85.7 kg)   LMP 06/13/2017   SpO2 98%   BMI 28.74 kg/m²   Post-op vital signs:  Stable     Exam:General appearance: alert, appears stated age and cooperative  Lungs: clear to auscultation bilaterally  Heart: regular rate and rhythm, S1, S2 normal, no murmur, click, rub or gallop  Abdomen: soft non distended. Yefri Brocks Umbilicus with Xeroform. No active signs of bleeding. Bilateral MITCH drains with serosanguinous drainage  : Garcia present with clear yellow urine    Assessment and Plan  Pt is a 49year old female s/p bilateral breast reconstruction with fat grafting, abdominoplasty with rectus plication and umbilical transposition  POD #0    Pain management: continue tylenol/oxycodone  FeNa:  Diet - Regular , Fluids: SLIV   :  Urine output is adequate. Keep garcia overnight for strict I&O's  Ambulation: OOB to chair. Must be in hunched position when ambulating  Respiratory:  IS at bedside, encourage hourly IS and deep breathing  Prophylaxis: ALYSIA Valverde NP-C  [phone]  Resident Support Staff

## 2022-02-25 VITALS
OXYGEN SATURATION: 96 % | SYSTOLIC BLOOD PRESSURE: 110 MMHG | TEMPERATURE: 97.9 F | RESPIRATION RATE: 16 BRPM | BODY MASS INDEX: 28.64 KG/M2 | DIASTOLIC BLOOD PRESSURE: 65 MMHG | HEART RATE: 65 BPM | HEIGHT: 68 IN | WEIGHT: 189 LBS

## 2022-02-25 LAB
ALBUMIN SERPL-MCNC: 3.8 G/DL (ref 3.4–5)
ANION GAP SERPL CALCULATED.3IONS-SCNC: 8 MMOL/L (ref 3–16)
BASOPHILS ABSOLUTE: 0 K/UL (ref 0–0.2)
BASOPHILS RELATIVE PERCENT: 0.3 %
BUN BLDV-MCNC: 10 MG/DL (ref 7–20)
CALCIUM SERPL-MCNC: 9.3 MG/DL (ref 8.3–10.6)
CHLORIDE BLD-SCNC: 103 MMOL/L (ref 99–110)
CO2: 26 MMOL/L (ref 21–32)
CREAT SERPL-MCNC: 0.7 MG/DL (ref 0.6–1.1)
EOSINOPHILS ABSOLUTE: 0 K/UL (ref 0–0.6)
EOSINOPHILS RELATIVE PERCENT: 0.7 %
GFR AFRICAN AMERICAN: >60
GFR NON-AFRICAN AMERICAN: >60
GLUCOSE BLD-MCNC: 95 MG/DL (ref 70–99)
HCT VFR BLD CALC: 34.9 % (ref 36–48)
HEMOGLOBIN: 11.8 G/DL (ref 12–16)
LYMPHOCYTES ABSOLUTE: 1.5 K/UL (ref 1–5.1)
LYMPHOCYTES RELATIVE PERCENT: 27.6 %
MAGNESIUM: 2.1 MG/DL (ref 1.8–2.4)
MCH RBC QN AUTO: 28.5 PG (ref 26–34)
MCHC RBC AUTO-ENTMCNC: 33.9 G/DL (ref 31–36)
MCV RBC AUTO: 83.9 FL (ref 80–100)
MONOCYTES ABSOLUTE: 0.4 K/UL (ref 0–1.3)
MONOCYTES RELATIVE PERCENT: 7.2 %
NEUTROPHILS ABSOLUTE: 3.4 K/UL (ref 1.7–7.7)
NEUTROPHILS RELATIVE PERCENT: 64.2 %
PDW BLD-RTO: 12.7 % (ref 12.4–15.4)
PHOSPHORUS: 4 MG/DL (ref 2.5–4.9)
PLATELET # BLD: 235 K/UL (ref 135–450)
PMV BLD AUTO: 9.2 FL (ref 5–10.5)
POTASSIUM SERPL-SCNC: 4.1 MMOL/L (ref 3.5–5.1)
RBC # BLD: 4.15 M/UL (ref 4–5.2)
SODIUM BLD-SCNC: 137 MMOL/L (ref 136–145)
WBC # BLD: 5.3 K/UL (ref 4–11)

## 2022-02-25 PROCEDURE — 6370000000 HC RX 637 (ALT 250 FOR IP): Performed by: STUDENT IN AN ORGANIZED HEALTH CARE EDUCATION/TRAINING PROGRAM

## 2022-02-25 PROCEDURE — 2500000003 HC RX 250 WO HCPCS

## 2022-02-25 PROCEDURE — 6370000000 HC RX 637 (ALT 250 FOR IP)

## 2022-02-25 PROCEDURE — 80069 RENAL FUNCTION PANEL: CPT

## 2022-02-25 PROCEDURE — 83735 ASSAY OF MAGNESIUM: CPT

## 2022-02-25 PROCEDURE — 6360000002 HC RX W HCPCS

## 2022-02-25 PROCEDURE — 99024 POSTOP FOLLOW-UP VISIT: CPT | Performed by: SURGERY

## 2022-02-25 PROCEDURE — 36415 COLL VENOUS BLD VENIPUNCTURE: CPT

## 2022-02-25 PROCEDURE — 2580000003 HC RX 258

## 2022-02-25 PROCEDURE — 85025 COMPLETE CBC W/AUTO DIFF WBC: CPT

## 2022-02-25 RX ORDER — GINSENG 100 MG
CAPSULE ORAL
Qty: 14 G | Refills: 0 | Status: SHIPPED | OUTPATIENT
Start: 2022-02-25 | End: 2022-03-07

## 2022-02-25 RX ORDER — GINSENG 100 MG
CAPSULE ORAL 3 TIMES DAILY
Status: DISCONTINUED | OUTPATIENT
Start: 2022-02-25 | End: 2022-02-25 | Stop reason: HOSPADM

## 2022-02-25 RX ORDER — CLINDAMYCIN HYDROCHLORIDE 300 MG/1
300 CAPSULE ORAL 3 TIMES DAILY
Qty: 30 CAPSULE | Refills: 0 | Status: SHIPPED | OUTPATIENT
Start: 2022-02-25 | End: 2022-03-07

## 2022-02-25 RX ORDER — ONDANSETRON 4 MG/1
4 TABLET, ORALLY DISINTEGRATING ORAL EVERY 8 HOURS PRN
Qty: 20 TABLET | Refills: 0 | Status: SHIPPED | OUTPATIENT
Start: 2022-02-25

## 2022-02-25 RX ORDER — DOCUSATE SODIUM 100 MG/1
100 CAPSULE, LIQUID FILLED ORAL DAILY PRN
Qty: 30 CAPSULE | Refills: 0 | Status: SHIPPED | OUTPATIENT
Start: 2022-02-25

## 2022-02-25 RX ORDER — DIAZEPAM 2 MG/1
2 TABLET ORAL EVERY 6 HOURS PRN
Qty: 28 TABLET | Refills: 0 | Status: SHIPPED | OUTPATIENT
Start: 2022-02-25 | End: 2022-03-07

## 2022-02-25 RX ORDER — DOCUSATE SODIUM 100 MG/1
100 CAPSULE, LIQUID FILLED ORAL 2 TIMES DAILY
Status: DISCONTINUED | OUTPATIENT
Start: 2022-02-25 | End: 2022-02-25 | Stop reason: HOSPADM

## 2022-02-25 RX ORDER — OXYCODONE HYDROCHLORIDE 5 MG/1
5 TABLET ORAL EVERY 6 HOURS PRN
Qty: 28 TABLET | Refills: 0 | Status: SHIPPED | OUTPATIENT
Start: 2022-02-25 | End: 2022-03-04

## 2022-02-25 RX ADMIN — CLINDAMYCIN PHOSPHATE 600 MG: 600 INJECTION, SOLUTION INTRAVENOUS at 10:23

## 2022-02-25 RX ADMIN — CLINDAMYCIN PHOSPHATE 600 MG: 600 INJECTION, SOLUTION INTRAVENOUS at 02:30

## 2022-02-25 RX ADMIN — HEPARIN SODIUM 5000 UNITS: 5000 INJECTION INTRAVENOUS; SUBCUTANEOUS at 08:20

## 2022-02-25 RX ADMIN — BACITRACIN: 500 OINTMENT TOPICAL at 08:15

## 2022-02-25 RX ADMIN — Medication 10 ML: at 08:20

## 2022-02-25 RX ADMIN — OXYCODONE 5 MG: 5 TABLET ORAL at 10:29

## 2022-02-25 ASSESSMENT — PAIN SCALES - GENERAL
PAINLEVEL_OUTOF10: 5
PAINLEVEL_OUTOF10: 3
PAINLEVEL_OUTOF10: 0
PAINLEVEL_OUTOF10: 2

## 2022-02-25 ASSESSMENT — PAIN DESCRIPTION - ORIENTATION: ORIENTATION: LOWER

## 2022-02-25 ASSESSMENT — PAIN DESCRIPTION - LOCATION: LOCATION: BACK

## 2022-02-25 ASSESSMENT — PAIN DESCRIPTION - DESCRIPTORS: DESCRIPTORS: ACHING

## 2022-02-25 NOTE — PROGRESS NOTES
Patient given written and verbal instructions for discharge. VS stable. Patient given education for prescriptions, incision care, follow up instructions and MITCH care x2. Patient denies any concerns.  driving her home.

## 2022-02-25 NOTE — DISCHARGE SUMMARY
Physician Discharge Summary     Patient ID:  Yosvany Griffiths  [id number]  49 y.o.  1965    Admit date: 2022    Discharge date and time: 22    Admitting Physician: Sebastian Saldana MD     Discharge Physician: Sebastian Saldana MD     Admission Diagnoses: Bilateral malignant neoplasm of breast in female, unspecified estrogen receptor status, unspecified site of breast ([facility] [C50.911, C50.912]  Rectus diastasis [M62.08]  S/P breast reconstruction [Z98.890]  S/P abdominoplasty [Z98.890]    Discharge Diagnoses: Bilateral malignant neoplasm of breast in female, unspecified estrogen receptor status, unspecified site of breast ([address] [C50.911, C50.912]  Rectus diastasis [M62.08]  S/P breast reconstruction [Z98.890]  S/P abdominoplasty [Z98.890]    PMH:  has a past medical history of Acid reflux, Anemia, Anemia, Blood transfusion, Dental crowns present, Feels cold, History of blood transfusion, Malignant neoplasm of overlapping sites of left female breast ([address] Menorrhagia, Obesity, Shingles, and Sleep apnea. PSH:  has a past surgical history that includes  section; Tubal ligation; other surgical history (2011); Sleeve Gastrectomy; Colonoscopy; Cholecystectomy (2016); Hysterectomy (2017); Mastectomy (Left, 3/31/2020); Breast enhancement surgery (Left, 3/31/2020); Breast surgery (Right, 2020); Mastectomy (Right, 2020); Breast reconstruction (N/A, 2020); Breast enhancement surgery (Right, 2020); Breast reconstruction (Bilateral, 2022); and Abdominoplasty (Bilateral, 2022). Allergies: Cephalexin    Admission Condition: good    Discharged Condition: stable    Indication for Admission: Bilateral malignant neoplasm of breast in female, unspecified estrogen receptor status, unspecified site of breast ([facility] [C50.911, C50.912]  Rectus diastasis [M62.08]  S/P breast reconstruction [L87.277]  S/P abdominoplasty [facility] Course: This is a 49 y.o.  F who was admitted to [facility] for post-op management following planned procedure. Patient underwent: revision of bilateral breast reconstruction with fat grafting, abdominoplasty with rectus plication umbilical transposition (2/24)    Post op: Patient was resting in bed and tolerating sips of liquids without nausea or vomiting. Pain was well-controlled. Uribe in place. Post-op plan included the following, and remained the same throughout admission unless otherwise noted:  Pain management: continue tylenol/oxycodone, Dilaudid pain panel, PRN valium  FeNa:  Diet - Regular , Fluids: SLIV   :  Urine output is adequate. Uribe to be removed at midnight  Ambulation: OOB to chair. Must be in hunched position when ambulating  Respiratory:  IS at bedside, encourage hourly IS and deep breathing  Prophylaxis: AC boots, SQH to begin in AM    The next day she was tolerating diet without any associated nausea or vomiting. She was safely following activity restrictions and she was voiding on her own. Her pain was tolerable on oral pain medications. She was discharged home in stable condition. Patient discharged home in stable condition        Consults:   N/A    Significant Diagnostic Studies:   N/A    Treatments/Procedures: surgery: revision of bilateral breast reconstruction with fat grafting, abdominoplasty with rectus plication umbilical transposition (2/24)    Disposition: home    Patient Instructions:   See discharge instruction form.     Signed:  Nickie Branch DO, PGY-1  2/25/2022  4:19 AM  [phone]

## 2022-02-25 NOTE — PROGRESS NOTES
Pt alert and oriented. VSS. Pt reported a headache at beginning of shift, scheduled Tylenol given with benefit, see MAR. Pt tolerating diet. Pt ambulated in hallway this evening. Pt receiving intermittent ABX, see MAR. Pt JPs with minimal output, see flowsheet. Pt resting comfortably in bed. Pt has call light within reach, bed in lowest position with wheels locked, 2/4 side rails up, and bed alarm on. Will continue to monitor.

## 2022-02-25 NOTE — PROGRESS NOTES
VS stable. Patient tolerating solid foods without difficulty. Patient independent- ambulating. Complains of some lowerback discomfort- heat pack and changed positions. Patient is hopeful to go home today.

## 2022-02-25 NOTE — CARE COORDINATION
Case Management Assessment            Discharge Note                    Date / Time of Note: 2/25/2022 2:32 PM                  Discharge Note Completed by: Krissy Davila RN    Patient Name: Jacey Diego   YOB: 1965  Diagnosis: Bilateral malignant neoplasm of breast in female, unspecified estrogen receptor status, unspecified [facility] [C50.911, C50.912]  Rectus diastasis [M62.08]  S/P breast reconstruction [Z98.890]  S/P abdominoplasty [Z98.890]   Date / Time: 2/24/2022  6:47 AM    Current PCP: Pat Paredes patient: No    Hospitalization in the last 30 days: No    Advance Directives:  Code Status: Full Code  [place] DNR form completed and on chart: Not Indicated    Financial:  Payor: Ari Numbers / Plan: BCBS - [place] PPO / Product Type: *No Product type* /      Pharmacy:    [facility] [address] HUMPHREY [address] [facility], [address] [facility],[address]  [address] [facility],[address]  Phone: [phone] Fax: [phone]    [facility]/pharmacy #[id number]- [place] [facility] [phone] John Hernadez [phone]  [address] [place] [address]  Phone: [phone] Fax: [phone]      Assistance purchasing medications?:    Assistance provided by Case Management: None at this time    Does patient want to participate in local refill/ meds to beds program?:      Meds To Beds General Rules:  1. Can ONLY be done Monday- Friday between 8:30am-5pm  2. Prescription(s) must be in pharmacy by 3pm to be filled same day  3. Copy of patient's insurance/ prescription drug card and patient face sheet must be sent along with the prescription(s)  4. Cost of Rx cannot be added to hospital bill. If financial assistance is needed, please contact unit  or ;  or  CANNOT provide pharmacy voucher for patients co-pays  5.  Patients can then  the prescription on their way out of the hospital at discharge, or pharmacy can deliver to the bedside if staff is available. (payment due at time of pick-up or delivery - cash, check, or card accepted)     Able to afford home medications/ co-pay costs: Yes    ADLS:  Current PT AM-PAC Score:   /24  Current OT AM-PAC Score:   /24      DISCHARGE Disposition: Home- No Services Needed    LOC at discharge: Not Applicable  JUAN Completed: Not Indicated    Notification completed in HENS/PAS?:  Not Applicable    IMM Completed:   Not Indicated    Transportation:  Transportation PLAN for discharge: family   Mode of Transport: [address]:  [address] ordered at discharge: Not [address]: Not Applicable  Orders faxed: No    Durable Medical Equipment:  DME Provider: none  Equipment obtained during hospitalization:     Home Oxygen and Respiratory Equipment:  Oxygen needed at discharge?: Not [address]: Not Applicable  Portable tank available for discharge?: Not Indicated    Dialysis:  Dialysis patient: No    Dialysis Center:  Not Applicable      Additional CM Notes: Pt from home ind with family will Dc home no needs from CM will follow up OP with Surgical team     The Plan for Transition of Care is related to the following treatment goals of Bilateral malignant neoplasm of breast in female, unspecified estrogen receptor status, unspecified site of breast [facility] [C50.911, C50.912]  Rectus diastasis [M62.08]  S/P breast reconstruction [K94.148]  S/P abdominoplasty [Z98.890]    The Patient and/or patient representative Himanshu Carranza and her family were provided with a choice of provider and agrees with the discharge plan Yes    Freedom of choice list was provided with basic dialogue that supports the patient's individualized plan of care/goals and shares the quality data associated with the providers.  Not Indicated    Care Transitions patient: No    Latia aSenz RN  [facility]  Case Management Department  Ph: [phone]  Fax: [phone]

## 2022-02-25 NOTE — ANESTHESIA POSTPROCEDURE EVALUATION
Department of Anesthesiology  Postprocedure Note    Patient: Selma Casillas  MRN: [id number]  YOB: 1965  Date of evaluation: 2/24/2022  Time:  8:12 PM     Procedure Summary     Date: 02/24/22 Room / Location: [address]    Anesthesia Start: 0900 Anesthesia Stop: 2238    Procedures:       BILATERAL BREAST RECONSTRUCTION WITH FAT GRAFTING, ABDOMINOPLASTY WITH RECTUS PLICATION AND UMBILICAL TRANSPOSITION (Bilateral )      . (Bilateral ) Diagnosis:       Bilateral malignant neoplasm of breast in female, unspecified estrogen receptor status, unspecified site of breast (HCC)      Rectus diastasis      S/P breast reconstruction      (BREAST CANCER C5.912, Rectus diastasis [M62.08] S/P breast reconstruction [F23.475])    Surgeons: Mick Noriega MD Responsible Provider: Rebecca Saenz DO    Anesthesia Type: general ASA Status: 3          Anesthesia Type: general    Salma Phase I: Salma Score: 9    Salma Phase II:      Last vitals: Reviewed and per EMR flowsheets.        Anesthesia Post Evaluation    Patient location during evaluation: PACU  Patient participation: complete - patient participated  Level of consciousness: awake and alert  Pain score: 0  Airway patency: patent  Nausea & Vomiting: no nausea and no vomiting  Complications: no  Cardiovascular status: hemodynamically stable  Respiratory status: acceptable  Hydration status: euvolemic

## 2022-02-25 NOTE — PROGRESS NOTES
Plastic Surgery   Daily Progress Note      CC: Breast cancer status post implant based reconstruction, significant rectus diastasis    SUBJECTIVE:  No acute issues overnight. Pain is well-controlled with medications. Patient is tolerating diet without nausea or vomiting. Patient is ambulating in hunched over position. Uribe removed at midnight and patient has voided. AF and HDS.    ROS:   A 14 point review of systems was conducted, significant findings as noted above. All other systems negative.       OBJECTIVE:    PHYSICAL EXAM:  Vitals:    02/24/22 1419 02/24/22 2016 02/24/22 2249 02/25/22 0230   BP: 102/67 108/60 120/67 112/68   Pulse: 69 67 65 67   Resp: 16 16 16 16   Temp:  97.9 °F (36.6 °C) 97.9 °F (36.6 °C) 97.6 °F (36.4 °C)   TempSrc:  Oral Oral Oral   SpO2: 98% 97% 97% 97%   Weight:       Height:           General appearance: Alert, no acute distress, well-developed, well-nourished  HEENT: Normocephalic, extraocular movements grossly intact, moist mucous membranes  Chest/Lungs: normal inspiratory effort, symmetric chest rise, no accessory muscle use; breast fat grafting incisions with steri-strips, no drainage  Cardiovascular: Regular rate and rhythm  Abdomen: Soft, non-distended, appropriately tender to palpation, no peritoneal signs, abdominal incision is c/d/i without erythema and well-approximated with Prineo dressing; 2x MITCH drains with surgicel stained fluid; umbuilcal dressing removed and bacitracin applied  Neuro: A&Ox3, no gross motor or sensory neuro deficits  Extremities: no edema, no cyanosis      ASSESSMENT & PLAN:   Pt is a 49year old female s/p bilateral breast reconstruction with fat grafting, abdominoplasty with rectus plication and umbilical transposition  POD #1.     - cont general diet  - Uribe removed at midnight and patient has voided  - abdominal dressing removed this AM; Bacitracin applied to umbilicus, cont to apply daily  - cont beach chair position while sitting and hunched over while ambulating  - cont IV Abx while drains in place; will switch to PO Abx at discharge  - SQH to start today if labs are stable; administer to arms only  - if continues to do well, will anticipate discharge later today    Mahendra Hill DO  PGY1, General Surgery  02/25/22  6:43 AM    I am post-call today and will not be on campus. Please contact Dr. Mirella Lala at [phone] for questions or concerns regarding this patient. I saw and independently examined the patient today. I agree with the history of present illness, past medical/surgical histories, family history, social history, medication list and allergies as listed. The review of systems is as noted above. My physical exam confirms the findings listed above. Review of labs, pathology reports, radiology reports and medical records confirm the findings noted above. I edited the note where appropriate in italics, strikethrough font, or underline. Doing well overall. Pain well controlled. No hematoma/seroma. Drains serosang. Plan for DC home today.     Rissa Giang MD  [facility] [address] [place] [address] [address] Reconstructive Surgery  [phone]  02/25/22

## 2022-02-28 ENCOUNTER — TELEPHONE (OUTPATIENT)
Dept: SURGERY | Age: 57
End: 2022-02-28

## 2022-03-01 ENCOUNTER — TELEPHONE (OUTPATIENT)
Dept: SURGERY | Age: 57
End: 2022-03-01

## 2022-03-01 NOTE — TELEPHONE ENCOUNTER
Pt called informs she had surgery on Thursday. She now has fever of 99.3 and over all is not feeling well. She back is also still hurting. She concerned with having the fever. Please call pt.
Returned patient call this AM and left a message.
DISPLAY PLAN FREE TEXT

## 2022-03-02 ENCOUNTER — TELEPHONE (OUTPATIENT)
Dept: SURGERY | Age: 57
End: 2022-03-02

## 2022-03-02 NOTE — TELEPHONE ENCOUNTER
Анна with [employer] called in requesting a new physician statement     Please contact Hollis Brody at [phone] Ext: [address] [place] [address] Customer number: [phone]

## 2022-03-03 NOTE — TELEPHONE ENCOUNTER
Returned call to number below. They requested to confirm patient surgery date: which was 2/24/22 and her discharge date which was 2/25/22.  DONE

## 2022-03-04 ENCOUNTER — TELEPHONE (OUTPATIENT)
Dept: SURGERY | Age: 57
End: 2022-03-04

## 2022-03-06 NOTE — PROGRESS NOTES
[facility]    PROCEDURE:   1.  Revision bilateral breast reconstruction with fat grafting  2. Abdominoplasty with rectus plication and umbilical transposition  DATE: 2/24/22    Kaylyn Washburn has been recovering well since her procedure. Pain has been well controlled without pain medications. She notes some pain on her left at the drain. EXAM    Pulse 87   Temp 98.6 °F (37 °C)   Wt 185 lb (83.9 kg)   LMP 06/13/2017   SpO2 98%   BMI 28.13 kg/m²     GEN: NAD   BREAST: Expected ecchymosis from fat grafting  ABD: Incision healing well  Improved contour  Umbilicus viable  No hematoma/seroma  Drains serosang    IMP: 49 y. o.female s/p breast reconstruction with fat grafting & abdominoplasty  PLAN: She is overall happy with her results. Will remove the left drain given her pain. Will return in 1 week for removal of her contralateral drain and then follow-up in 1 month.      Lesia Recio MD  [address] [place] [address] [address] Reconstructive Surgery  [phone]  03/07/22

## 2022-03-07 ENCOUNTER — OFFICE VISIT (OUTPATIENT)
Dept: SURGERY | Age: 57
End: 2022-03-07

## 2022-03-07 VITALS — HEART RATE: 87 BPM | OXYGEN SATURATION: 98 % | WEIGHT: 185 LBS | TEMPERATURE: 98.6 F | BODY MASS INDEX: 28.13 KG/M2

## 2022-03-07 DIAGNOSIS — Z09 POSTOP CHECK: Primary | ICD-10-CM

## 2022-03-07 PROCEDURE — 99024 POSTOP FOLLOW-UP VISIT: CPT | Performed by: SURGERY

## 2022-03-09 NOTE — TELEPHONE ENCOUNTER
Returned patient call, patient is requesting the open abdomen pictures taken in OR. Patient is back in on Monday and will bring USB at that time.

## 2022-03-09 NOTE — TELEPHONE ENCOUNTER
Pt called she requested pictures and provided a drive to put them on. Pt informs they are not the exact pictures she was wanting. She is asking if there is another way to get the specific surgical pictures she prefers to have. Please call pt.

## 2022-03-14 ENCOUNTER — NURSE ONLY (OUTPATIENT)
Dept: SURGERY | Age: 57
End: 2022-03-14

## 2022-03-14 NOTE — PROGRESS NOTES
[facility]  Post-Op Visit    Patient: Aubrey Moreno  YOB: 1965    HPI: Aubrey Moreno is a 49 y.o. female who presents today for post-op visit. 2/24/2022 Revision bilateral breast reconstruction with fat grafting, Abdominoplasty with rectus plication and umbilical transposition. Patient doing well since surgery, C/O back discomfort from stooping over after surgery. Right abdominal drain draining less than 30 cc/ day  for over 2 days. Right abdominal drain removed without difficulty. Lower abdominal incision and umbilical incision    well approximated and healing well. Activity restrictions reinforced.        Chief Complaint   Patient presents with    Post-Op Check       Plan: Follow up on 3/30/2022 prior to returning to work.       KELSY Holbrook, [address]  [phone]

## 2022-03-30 ENCOUNTER — OFFICE VISIT (OUTPATIENT)
Dept: SURGERY | Age: 57
End: 2022-03-30

## 2022-03-30 VITALS
HEART RATE: 82 BPM | OXYGEN SATURATION: 100 % | SYSTOLIC BLOOD PRESSURE: 124 MMHG | DIASTOLIC BLOOD PRESSURE: 78 MMHG | BODY MASS INDEX: 28.28 KG/M2 | WEIGHT: 186 LBS | TEMPERATURE: 98.1 F

## 2022-03-30 DIAGNOSIS — Z09 POSTOP CHECK: Primary | ICD-10-CM

## 2022-03-30 PROCEDURE — 99024 POSTOP FOLLOW-UP VISIT: CPT | Performed by: SURGERY

## 2022-03-30 NOTE — PROGRESS NOTES
[facility]    PROCEDURE:   1.  Revision bilateral breast reconstruction with fat grafting  2. Abdominoplasty with rectus plication and umbilical transposition  DATE: 2/24/22    Jaswant Barajas has been recovering well since her procedure. Pain has been well controlled without pain medications. \    EXAM    /78   Pulse 82   Temp 98.1 °F (36.7 °C)   Wt 186 lb (84.4 kg)   LMP 06/13/2017   SpO2 100%   BMI 28.28 kg/m²     GEN: NAD   BREAST: Good contour  ABD: Incision healing well  Improved contour  Umbilicus viable  No hematoma/seroma      IMP: 49 y. o.female s/p breast reconstruction with fat grafting & abdominoplasty  PLAN: She is overall extremely happy with her results. Will follow-up in 6 months as she may desire to have additional intervention / contouring.     Vincent Herrera MD  [address] [place] [address] [address] Reconstructive Surgery  [phone]  03/30/22

## 2024-12-14 ENCOUNTER — HOSPITAL ENCOUNTER (EMERGENCY)
Age: 59
Discharge: HOME | End: 2024-12-14
Payer: COMMERCIAL

## 2024-12-14 VITALS
TEMPERATURE: 98.2 F | WEIGHT: 200.6 LBS | SYSTOLIC BLOOD PRESSURE: 138 MMHG | HEIGHT: 68 IN | DIASTOLIC BLOOD PRESSURE: 78 MMHG | OXYGEN SATURATION: 97 % | BODY MASS INDEX: 30.4 KG/M2 | HEART RATE: 67 BPM | RESPIRATION RATE: 14 BRPM

## 2024-12-14 DIAGNOSIS — N81.10 FEMALE BLADDER PROLAPSE: Primary | ICD-10-CM

## 2024-12-14 PROCEDURE — 99282 EMERGENCY DEPT VISIT SF MDM: CPT

## 2024-12-14 ASSESSMENT — LIFESTYLE VARIABLES
HOW OFTEN DO YOU HAVE A DRINK CONTAINING ALCOHOL: 2-3 TIMES A WEEK
HOW MANY STANDARD DRINKS CONTAINING ALCOHOL DO YOU HAVE ON A TYPICAL DAY: 1 OR 2

## 2024-12-14 ASSESSMENT — PAIN - FUNCTIONAL ASSESSMENT: PAIN_FUNCTIONAL_ASSESSMENT: NONE - DENIES PAIN

## 2024-12-14 NOTE — ED PROVIDER NOTES
[facility]  ED  EMERGENCY DEPARTMENT ENCOUNTER        Pt Name: Sushila Hensley  MRN: [id number]  Birthdate 1965  Date of evaluation: 12/14/2024  Provider: CLARK CARABALLO PA-C  PCP: Liliana Coronado APRN - NANCY  ED Attending: MD Geoff      MIKE. I have evaluated this patient.      CHIEF COMPLAINT:     Chief Complaint   Patient presents with    Groin Swelling     Patient complains of a buldge hanging out the vagina - just noticed this morning.        HISTORY OF PRESENT ILLNESS:      History provided by the patient. No limitations.    Sushila Hensley is a 59 y.o. female who arrives to the ED by private vehicle.  Patient accompanied by her .  Patient is here complaining of a bulge to her vagina that she noticed this morning.  She is never felt anything like it.  She denies any significant pain.  She has not had any recent urinary symptoms such as frequency, urgency, hesitancy.  She has remotely had a partial hysterectomy (ovaries remain).  She has had 3 pregnancies with 3 vaginal deliveries in the past.  She is established with OB/GYN, Dr. Waddell.    Nursing Notes were reviewed     REVIEW OF SYSTEMS:     Review of Systems  Positives and pertinent negatives as per HPI.      PAST MEDICAL HISTORY:     Past Medical History:   Diagnosis Date    Acid reflux     history of no longer a problem    Anemia     Anemia     Blood transfusion     1985    Dental crowns present     Feels cold     feet and legs all the time    History of blood transfusion     Malignant neoplasm of overlapping sites of left female breast (HCC)     Menorrhagia     Obesity     Shingles 5/4/2016    Sleep apnea     LOST WT NO LONGER AN ISSUE       SURGICAL HISTORY:      Past Surgical History:   Procedure Laterality Date    ABDOMINOPLASTY Bilateral 2/24/2022    . performed by Rosina Esparza MD at [facility] OR    BREAST ENHANCEMENT SURGERY Left 3/31/2020    LEFT BREAST RECONSTRUCTION WITH STAGE 1 TISSUE EXPANDER PLACEMENT WITH

## 2025-01-27 ENCOUNTER — TELEPHONE (OUTPATIENT)
Dept: UROGYNECOLOGY | Age: 60
End: 2025-01-27

## 2025-01-27 NOTE — TELEPHONE ENCOUNTER
Is currently waiting for her Np appt for a bladder prolapse, and has been managing with kegels, but is wondering if it would be safe to have intercourse with her , if diagnosed with grade, [facility] ER report may have it.

## 2025-01-27 NOTE — TELEPHONE ENCOUNTER
Returned patient's call - advised that it is ok to have intercourse if she desires, as she cannot necessarily harm the prolapse. Patient verbalized understanding, thankful for the call. Denies any further questions at this time.

## 2025-02-18 ENCOUNTER — OFFICE VISIT (OUTPATIENT)
Dept: UROGYNECOLOGY | Age: 60
End: 2025-02-18
Payer: COMMERCIAL

## 2025-02-18 VITALS
RESPIRATION RATE: 16 BRPM | HEART RATE: 68 BPM | TEMPERATURE: 98 F | DIASTOLIC BLOOD PRESSURE: 73 MMHG | OXYGEN SATURATION: 98 % | SYSTOLIC BLOOD PRESSURE: 119 MMHG

## 2025-02-18 DIAGNOSIS — N81.10 CYSTOCELE WITH RECTOCELE: Primary | ICD-10-CM

## 2025-02-18 DIAGNOSIS — R35.0 URINARY FREQUENCY: ICD-10-CM

## 2025-02-18 DIAGNOSIS — R39.15 URINARY URGENCY: ICD-10-CM

## 2025-02-18 DIAGNOSIS — N81.6 CYSTOCELE WITH RECTOCELE: Primary | ICD-10-CM

## 2025-02-18 LAB
BILIRUBIN, POC: NORMAL
BLOOD URINE, POC: NORMAL
CLARITY, POC: CLEAR
COLOR, POC: YELLOW
EMPTY COUGH STRESS TEST: NORMAL
FIRST SENSATION: 100 CC
FULL COUGH STRESS TEST: NORMAL
GLUCOSE URINE, POC: NORMAL MG/DL
KETONES, POC: NORMAL MG/DL
LEUKOCYTE EST, POC: NORMAL
MAX SENSATION: 200 CC
NITRATE, URINE POC: NORMAL
NITRITE, POC: NORMAL
PH, POC: 6.5
POST VOID RESIDUAL (PVR): 10 ML
PROTEIN, POC: NORMAL MG/DL
RBC URINE, POC: NORMAL
SECOND SENSATION: 130 CC
SPASM: NORMAL
SPECIFIC GRAVITY, POC: 1.01
UROBILINOGEN, POC: NORMAL MG/DL
WBC URINE, POC: NORMAL

## 2025-02-18 PROCEDURE — 99204 OFFICE O/P NEW MOD 45 MIN: CPT | Performed by: OBSTETRICS & GYNECOLOGY

## 2025-02-18 PROCEDURE — 51725 SIMPLE CYSTOMETROGRAM: CPT | Performed by: OBSTETRICS & GYNECOLOGY

## 2025-02-18 PROCEDURE — 81002 URINALYSIS NONAUTO W/O SCOPE: CPT | Performed by: OBSTETRICS & GYNECOLOGY

## 2025-02-18 RX ORDER — ESTRADIOL 0.1 MG/G
0.25 CREAM VAGINAL DAILY
Qty: 30 G | Refills: 0 | Status: SHIPPED | OUTPATIENT
Start: 2025-02-18

## 2025-02-18 ASSESSMENT — ENCOUNTER SYMPTOMS: CONSTIPATION: 1

## 2025-02-18 NOTE — PROGRESS NOTES
2025      HPI:     Name: Sushila Hensley  YOB: 1965    CC: Patient is a 59 y.o. female who is seen in consultation from No ref. provider found   for evaluation of prolapse.     HPI:  Bladder control problem: No  Bladder emptying problems:  No  Prolapse/Vaginal Support problems: Yes  Do you notice a bulge? Yes  How long have you had a protrusion or bulge?    Are your symptoms worse at the end of the day or after for prolonged periods? Yes  Do you push the protrusion back to help with a bowel movement or to empty your bladder? No  Have you ever used a pessary (plastic support device) for this problem? No     Bowel problem(s): No  Sexual History:  reports being sexually active and has had partner(s) who are male.  Pelvic PainNo  Ob/Gyn History:    OB History    Para Term  AB Living   3 3 3 0 0 0   SAB IAB Ectopic Molar Multiple Live Births   0 0 0 0 0 0      # Outcome Date GA Lbr Homero/2nd Weight Sex Type Anes PTL Lv   3 Term      CS-LTranv      2 Term      Vag-Spont      1 Term      Vag-Spont        Past Medical History:   Past Medical History:   Diagnosis Date    Acid reflux     history of no longer a problem    Anemia     Anemia     Blood transfusion         Dental crowns present     Feels cold     feet and legs all the time    History of blood transfusion     Malignant neoplasm of overlapping sites of left female breast (HCC)     Menorrhagia     Obesity     Shingles 2016    Sleep apnea     LOST WT NO LONGER AN ISSUE     Past Surgical History:   Past Surgical History:   Procedure Laterality Date    ABDOMINOPLASTY Bilateral 2022    . performed by Rosina Esparza MD at [facility] OR    BREAST ENHANCEMENT SURGERY Left 3/31/2020    LEFT BREAST RECONSTRUCTION WITH STAGE 1 TISSUE EXPANDER PLACEMENT WITH AUTODERM performed by Rosina Esparza MD at [facility] OR    BREAST ENHANCEMENT SURGERY Right 2020    LEFT EXCHANGE FOR PERMANENT IMPLANT performed by Rosina Esparza MD at [facility]

## (undated) DEVICE — BLADE CLIPPER SURG SENSICLIP

## (undated) DEVICE — PLASMABLADE PS210-030S 3.0S LOCK: Brand: PLASMABLADE™

## (undated) DEVICE — YANKAUER,OPEN TIP,W/O VENT,STERILE: Brand: MEDLINE INDUSTRIES, INC.

## (undated) DEVICE — INTENDED FOR TISSUE SEPARATION, AND OTHER PROCEDURES THAT REQUIRE A SHARP SURGICAL BLADE TO PUNCTURE OR CUT.: Brand: BARD-PARKER ® CARBON RIB-BACK BLADES

## (undated) DEVICE — STERILE LATEX POWDER-FREE SURGICAL GLOVESWITH NITRILE COATING: Brand: PROTEXIS

## (undated) DEVICE — Device

## (undated) DEVICE — SUTURE PDS II SZ 2-0 L18IN ABSRB VLT SH L26MM 1/2 CIR TAPR Z775D

## (undated) DEVICE — INTENDED USE FOR SURGICAL MARKING ON INTACT SKIN, ALSO PROVIDES A PERMANENT METHOD OF IDENTIFYING OBJECTS IN THE OPERATING ROOM: Brand: WRITESITE® PLUS MINI PREP RESISTANT MARKER

## (undated) DEVICE — ST FLUFF LG 1 PLY: Brand: DEROYAL

## (undated) DEVICE — SOLUTION IV IRRIG WATER 1000ML POUR BRL 2F7114

## (undated) DEVICE — SUTURE VCRL SZ 2-0 L27IN ABSRB UD L26MM SH 1/2 CIR J417H

## (undated) DEVICE — SUTURE ETHLN SZ 3-0 L30IN NONABSORBABLE BLK L36MM FSLX 3/8 1673BH

## (undated) DEVICE — ELECTROSURGICAL PENCIL ROCKER SWITCH NON COATED BLADE ELECTRODE 10 FT (3 M) CORD HOLSTER: Brand: MEGADYNE

## (undated) DEVICE — STERILE PVP: Brand: MEDLINE INDUSTRIES, INC.

## (undated) DEVICE — TOWEL,STOP FLAG GOLD N-W: Brand: MEDLINE

## (undated) DEVICE — SUTURE COAT VCRL SZ 4-0 L18IN ABSRB UD L19MM PS-2 1/2 CIR J496G

## (undated) DEVICE — TOWEL,OR,DSP,ST,BLUE,DLX,8/PK,10PK/CS: Brand: MEDLINE

## (undated) DEVICE — DRAIN SURG 15FR L3 16IN DIA47MM SIL RND HUBLESS FULL FLUT

## (undated) DEVICE — APPLICATOR MEDICATED 26 CC SOLUTION HI LT ORNG CHLORAPREP

## (undated) DEVICE — DRAPE,CHEST,FENES,15X10,STERIL: Brand: MEDLINE

## (undated) DEVICE — SPONGE,LAP,18"X18",DLX,XR,ST,5/PK,40/PK: Brand: MEDLINE

## (undated) DEVICE — Z DUP USE 2701075 SYSTEM SKIN CLSR 42CM DERMBND PRINEO

## (undated) DEVICE — DRAPE IRRIG FLD WRM W44XL44IN W/ AORN STD PRTBL INTRATEMP

## (undated) DEVICE — DRESSING GERM DIA1IN CNTR H DIA7MM BLU CHG ANTIMIC PROTCT

## (undated) DEVICE — GLOVE SURG SZ 75 L12IN FNGR THK79MIL GRN LTX FREE

## (undated) DEVICE — STANDARD HYPODERMIC NEEDLE,POLYPROPYLENE HUB: Brand: MONOJECT

## (undated) DEVICE — STERILE POLYISOPRENE POWDER-FREE SURGICAL GLOVES WITH EMOLLIENT COATING: Brand: PROTEXIS

## (undated) DEVICE — AEGIS 1" DISK 4MM HOLE, PEEL OPEN: Brand: MEDLINE

## (undated) DEVICE — RESERVOIR,SUCTION,100CC,SILICONE: Brand: MEDLINE

## (undated) DEVICE — E-Z CLEAN, NON-STICK, PTFE COATED, ELECTROSURGICAL BLADE ELECTRODE, MODIFIED EXTENDED INSULATION, 2.5 INCH (6.35 CM): Brand: MEGADYNE

## (undated) DEVICE — SYRINGE 20ML LL S/C 50

## (undated) DEVICE — SYRINGE IRRIG 60ML SFT PLIABLE BLB EZ TO GRP 1 HND USE W/

## (undated) DEVICE — BLANKET WRM W40.2XL55.9IN IORT LO BODY + MISTRAL AIR

## (undated) DEVICE — SHEET,DRAPE,40X58,STERILE: Brand: MEDLINE

## (undated) DEVICE — JEWISH HOSPITAL TURNOVER KIT: Brand: MEDLINE INDUSTRIES, INC.

## (undated) DEVICE — SUTURE PDS II SZ 2-0 L27IN ABSRB VLT L26MM CT-2 1/2 CIR Z333H

## (undated) DEVICE — 3M™ IOBAN™ 2 ANTIMICROBIAL INCISE DRAPE 6648EZ: Brand: IOBAN™ 2

## (undated) DEVICE — PADDING CAST W20XL29.8IN FOAM SELF ADH M SUPP LTWT RESTON

## (undated) DEVICE — SOLUTION IV 1000ML 0.9% SOD CHL

## (undated) DEVICE — NEEDLE HYPO 22GA L1.5IN BLK POLYPR HUB S STL REG BVL STR

## (undated) DEVICE — SUTURE MCRYL SZ 3-0 L27IN ABSRB UD L19MM PS-2 3/8 CIR PRIM Y427H

## (undated) DEVICE — SUTURE VCRL SZ 2-0 L18IN ABSRB UD CT-1 L36MM 1/2 CIR J839D

## (undated) DEVICE — SOLUTION IV 50ML 0.9% SOD CHL PH5 CONTAIN DEHP QP VIAFLX

## (undated) DEVICE — GLOVE ORANGE PI 7 1/2   MSG9075

## (undated) DEVICE — GLOVE ORANGE PI 8 1/2   MSG9085

## (undated) DEVICE — SYSTEM SKIN CLSR 22CM DERMBND PRINEO

## (undated) DEVICE — AGENT HEMSTAT 3GM OXIDIZED REGENERATED CELOS ABSRB FOR CONT (ORDER MULTIPLES OF 5EA)

## (undated) DEVICE — SYRINGE, LUER LOCK, 60ML: Brand: MEDLINE

## (undated) DEVICE — SUTURE PDS II SZ 2-0 L27IN ABSRB UD FS-1 L24MM 3/8 CIR REV Z443H

## (undated) DEVICE — GOWN,SIRUS,POLYRNF,BRTHSLV,LG,30/CS: Brand: MEDLINE

## (undated) DEVICE — COVER LT HNDL BLU PLAS

## (undated) DEVICE — GARMENT,MEDLINE,DVT,INT,CALF,MED, GEN2: Brand: MEDLINE

## (undated) DEVICE — SUTURE PERMAHAND SZ 3-0 L18IN NONABSORBABLE BLK SILK BRAID A184H

## (undated) DEVICE — SINGLE ACTION PUMPING SYSTEM

## (undated) DEVICE — SUTURE VCRL SZ 3-0 L27IN ABSRB UD L26MM SH 1/2 CIR J416H

## (undated) DEVICE — SURE SET-DOUBLE BASIN-LF: Brand: MEDLINE INDUSTRIES, INC.

## (undated) DEVICE — SUTURE VCRL SZ 4-0 L27IN ABSRB UD L19MM PS-2 3/8 CIR PRIM J426H

## (undated) DEVICE — SUTURE PERMAHAND SZ 0 L30IN NONABSORBABLE BLK L26MM SH 1/2 K834H

## (undated) DEVICE — APPLIER LIG CLP M L11IN TI STR RNG HNDL FOR 20 CLP DISP

## (undated) DEVICE — SURGICAL SET UP - SURE SET: Brand: MEDLINE INDUSTRIES, INC.

## (undated) DEVICE — BANDAGE,GAUZE,BULKEE II,4.5"X4.1YD,STRL: Brand: MEDLINE

## (undated) DEVICE — BRA SURG SUPP LG 38-40 IN ZIPPER

## (undated) DEVICE — BLADE ES L4IN INSUL EDGE

## (undated) DEVICE — BRA SURG COMPR XL 40-42 IN ZIPPER

## (undated) DEVICE — 450 ML BOTTLE OF 0.05% CHLORHEXIDINE GLUCONATE IN 99.95% STERILE WATER FOR IRRIGATION, USP AND APPLICATOR.: Brand: IRRISEPT ANTIMICROBIAL WOUND LAVAGE

## (undated) DEVICE — RETRACTOR SURG WIDE FLAT LO PROF LTWT PHOTONGUIDE

## (undated) DEVICE — DRAIN,WOUND,15FR,3/16,FULL-FLUTED: Brand: MEDLINE

## (undated) DEVICE — DECANTER BAG 9": Brand: MEDLINE INDUSTRIES, INC.

## (undated) DEVICE — PACK,UNIVERSAL,NO GOWNS: Brand: MEDLINE

## (undated) DEVICE — CHLORAPREP 26ML ORANGE

## (undated) DEVICE — STAPLER SKIN H3.9MM WIRE DIA0.58MM CRWN 6.9MM 35 STPL ROT

## (undated) DEVICE — STRIP,CLOSURE,WOUND,MEDI-STRIP,1/2X4: Brand: MEDLINE

## (undated) DEVICE — PLATE ES AD W 9FT CRD 2

## (undated) DEVICE — SUTURE ETHLN SZ 3-0 L18IN NONABSORBABLE BLK FS-1 L24MM 3/8 663H

## (undated) DEVICE — BINDER ABD UNIV H9IN WAIST 30-45IN E SFT COT PREM 3 PNL

## (undated) DEVICE — SUTURE PERMA-HAND SZ 2-0 L30IN NONABSORBABLE BLK L30MM FSL 679H

## (undated) DEVICE — SHEET,DRAPE,53X77,STERILE: Brand: MEDLINE

## (undated) DEVICE — TOTAL TRAY, 16FR 10ML SIL FOLEY, URN: Brand: MEDLINE

## (undated) DEVICE — PLASTIC MAJOR: Brand: MEDLINE INDUSTRIES, INC.

## (undated) DEVICE — 3M™ TEGADERM™ TRANSPARENT FILM DRESSING FRAME STYLE, 1624W, 2-3/8 IN X 2-3/4 IN (6 CM X 7 CM), 100/CT 4CT/CASE: Brand: 3M™ TEGADERM™

## (undated) DEVICE — BLADE SURG DISP EPICUT

## (undated) DEVICE — SUTURE VCRL SZ 2-0 L18IN ABSRB VLT L26MM SH 1/2 CIR J775D

## (undated) DEVICE — DRESSING,GAUZE,XEROFORM,CURAD,1"X8",ST: Brand: CURAD

## (undated) DEVICE — MEDICINE CUP, GRADUATED, STER: Brand: MEDLINE

## (undated) DEVICE — SUTURE PERMAHAND SZ 3-0 L18IN NONABSORBABLE BLK L26MM SH C013D